# Patient Record
Sex: FEMALE | Race: WHITE | NOT HISPANIC OR LATINO | Employment: OTHER | URBAN - METROPOLITAN AREA
[De-identification: names, ages, dates, MRNs, and addresses within clinical notes are randomized per-mention and may not be internally consistent; named-entity substitution may affect disease eponyms.]

---

## 2017-02-24 ENCOUNTER — GENERIC CONVERSION - ENCOUNTER (OUTPATIENT)
Dept: OTHER | Facility: OTHER | Age: 52
End: 2017-02-24

## 2017-03-06 ENCOUNTER — ALLSCRIPTS OFFICE VISIT (OUTPATIENT)
Dept: OTHER | Facility: OTHER | Age: 52
End: 2017-03-06

## 2017-03-20 ENCOUNTER — GENERIC CONVERSION - ENCOUNTER (OUTPATIENT)
Dept: OTHER | Facility: OTHER | Age: 52
End: 2017-03-20

## 2017-05-16 ENCOUNTER — GENERIC CONVERSION - ENCOUNTER (OUTPATIENT)
Dept: OTHER | Facility: OTHER | Age: 52
End: 2017-05-16

## 2017-05-16 ENCOUNTER — ALLSCRIPTS OFFICE VISIT (OUTPATIENT)
Dept: OTHER | Facility: OTHER | Age: 52
End: 2017-05-16

## 2017-05-16 DIAGNOSIS — F33.9 RECURRENT MAJOR DEPRESSIVE DISORDER (HCC): ICD-10-CM

## 2017-05-16 LAB
ADDITIONAL INFORMATION (HISTORICAL): NORMAL
ADEQUACY: (HISTORICAL): NORMAL
COMMENT (HISTORICAL): NORMAL
CYTOTECHNOLOGIST: (HISTORICAL): NORMAL
GENERAL CATEGORIZATION (HISTORICAL): NORMAL
HPV HIGH RISK (HISTORICAL): NORMAL
HPV MRNA E6/E7 (HISTORICAL): NORMAL
INFECTION (HISTORICAL): NORMAL
INTERPRETATION (HISTORICAL): NORMAL
LMP (HISTORICAL): NORMAL
PATHOLOGIST (HISTORICAL): NORMAL
PREV. BX: (HISTORICAL): NORMAL
PREV. PAP (HISTORICAL): NORMAL
REVIEWED BY (HISTORICAL): NORMAL
SOURCE (HISTORICAL): NORMAL
SPECIMEN STATUS REPORT (HISTORICAL): NORMAL

## 2018-01-13 VITALS
SYSTOLIC BLOOD PRESSURE: 124 MMHG | DIASTOLIC BLOOD PRESSURE: 72 MMHG | WEIGHT: 134.38 LBS | HEIGHT: 64 IN | RESPIRATION RATE: 15 BRPM | OXYGEN SATURATION: 97 % | BODY MASS INDEX: 22.94 KG/M2 | HEART RATE: 70 BPM

## 2018-01-15 VITALS
RESPIRATION RATE: 15 BRPM | HEIGHT: 64 IN | WEIGHT: 139 LBS | OXYGEN SATURATION: 98 % | SYSTOLIC BLOOD PRESSURE: 118 MMHG | BODY MASS INDEX: 23.73 KG/M2 | HEART RATE: 72 BPM | DIASTOLIC BLOOD PRESSURE: 62 MMHG

## 2018-01-29 ENCOUNTER — OFFICE VISIT (OUTPATIENT)
Dept: FAMILY MEDICINE CLINIC | Facility: CLINIC | Age: 53
End: 2018-01-29
Payer: COMMERCIAL

## 2018-01-29 VITALS
RESPIRATION RATE: 15 BRPM | SYSTOLIC BLOOD PRESSURE: 118 MMHG | HEIGHT: 64 IN | OXYGEN SATURATION: 98 % | HEART RATE: 87 BPM | BODY MASS INDEX: 24.34 KG/M2 | DIASTOLIC BLOOD PRESSURE: 64 MMHG | WEIGHT: 142.6 LBS

## 2018-01-29 DIAGNOSIS — N30.01 ACUTE CYSTITIS WITH HEMATURIA: Primary | ICD-10-CM

## 2018-01-29 DIAGNOSIS — K21.9 GASTROESOPHAGEAL REFLUX DISEASE, ESOPHAGITIS PRESENCE NOT SPECIFIED: ICD-10-CM

## 2018-01-29 DIAGNOSIS — F32.A DEPRESSION, UNSPECIFIED DEPRESSION TYPE: Primary | ICD-10-CM

## 2018-01-29 PROCEDURE — 99214 OFFICE O/P EST MOD 30 MIN: CPT | Performed by: FAMILY MEDICINE

## 2018-01-29 PROCEDURE — 3008F BODY MASS INDEX DOCD: CPT | Performed by: FAMILY MEDICINE

## 2018-01-29 RX ORDER — FLUOXETINE HYDROCHLORIDE 20 MG/1
40 CAPSULE ORAL DAILY
COMMUNITY
Start: 2017-05-16 | End: 2018-12-13

## 2018-01-29 RX ORDER — OMEPRAZOLE 20 MG/1
40 CAPSULE, DELAYED RELEASE ORAL DAILY
Qty: 30 CAPSULE | Refills: 5 | Status: SHIPPED | OUTPATIENT
Start: 2018-01-29 | End: 2018-04-26 | Stop reason: SDUPTHER

## 2018-01-29 RX ORDER — CITALOPRAM 20 MG/1
20 TABLET ORAL DAILY
Qty: 30 TABLET | Refills: 5 | Status: SHIPPED | OUTPATIENT
Start: 2018-01-29 | End: 2018-12-13 | Stop reason: SDUPTHER

## 2018-01-29 RX ORDER — CIPROFLOXACIN 500 MG/1
500 TABLET, FILM COATED ORAL EVERY 12 HOURS SCHEDULED
Qty: 6 TABLET | Refills: 0 | Status: SHIPPED | OUTPATIENT
Start: 2018-01-29 | End: 2018-02-01

## 2018-01-29 RX ORDER — OMEPRAZOLE 20 MG/1
40 CAPSULE, DELAYED RELEASE ORAL DAILY
COMMUNITY
Start: 2017-01-21 | End: 2018-01-29 | Stop reason: SDUPTHER

## 2018-01-29 RX ORDER — GLATIRAMER 40 MG/ML
40 INJECTION, SOLUTION SUBCUTANEOUS 3 TIMES WEEKLY
COMMUNITY

## 2018-01-29 RX ORDER — FLUTICASONE PROPIONATE 50 MCG
2 SPRAY, SUSPENSION (ML) NASAL DAILY
COMMUNITY
Start: 2017-05-16 | End: 2019-02-01

## 2018-01-29 RX ORDER — ALPRAZOLAM 0.25 MG/1
1 TABLET ORAL
COMMUNITY
Start: 2017-03-06 | End: 2019-10-07

## 2018-01-29 RX ORDER — CITALOPRAM 20 MG/1
1 TABLET ORAL DAILY
COMMUNITY
Start: 2017-03-06 | End: 2018-01-29 | Stop reason: SDUPTHER

## 2018-01-29 NOTE — PATIENT INSTRUCTIONS

## 2018-01-29 NOTE — PROGRESS NOTES
Assessment/Plan:    No problem-specific Assessment & Plan notes found for this encounter  Diagnoses and all orders for this visit:    Acute cystitis with hematuria  -     ciprofloxacin (CIPRO) 500 mg tablet; Take 1 tablet (500 mg total) by mouth every 12 (twelve) hours for 3 days  -     Cytology, urine; Future    Other orders  -     ALPRAZolam (XANAX) 0 25 mg tablet; Take 1 tablet by mouth  -     citalopram (CeleXA) 20 mg tablet; Take 1 tablet by mouth daily  -     glatiramer acetate (COPAXONE) 20 mg/mL SOSY injection syringe; Inject under the skin  -     omeprazole (PriLOSEC) 20 mg delayed release capsule; Take 40 mg by mouth daily    -     fluticasone (FLONASE) 50 mcg/act nasal spray; 2 sprays into each nostril daily    -     FLUoxetine (PROzac) 20 mg capsule; Take 40 mg by mouth daily            Subjective:      Patient ID: Britany Bennett is a 45 y o  female  She was spontaneously at 5:00 a m  Paulo Nelson She used the restroom and noticed that the toilet water was pink  She had no pain  She has no dysuria, but she does have a bit of frequency and maybe some urgency  She has no fevers or chills  She has no vaginal discharge  She has no back pain  She has had no red beets, pedal legs, or any red dye of any kind  She has had urinary tract infections in the past but they did not feel like this  She has no flank pain  She has a 30+ pack-year history  Blood in Urine   Irritative symptoms include frequency  The following portions of the patient's history were reviewed and updated as appropriate:   She  has no past medical history on file  She  does not have a problem list on file  She  has no past surgical history on file  Her family history is not on file  She  reports that she has quit smoking  She has never used smokeless tobacco  She reports that she drinks alcohol  Her drug history is not on file    Current Outpatient Prescriptions   Medication Sig Dispense Refill    citalopram (CeleXA) 20 mg tablet Take 1 tablet by mouth daily      fluticasone (FLONASE) 50 mcg/act nasal spray 2 sprays into each nostril daily        glatiramer acetate (COPAXONE) 20 mg/mL SOSY injection syringe Inject under the skin      omeprazole (PriLOSEC) 20 mg delayed release capsule Take 40 mg by mouth daily        ALPRAZolam (XANAX) 0 25 mg tablet Take 1 tablet by mouth      ciprofloxacin (CIPRO) 500 mg tablet Take 1 tablet (500 mg total) by mouth every 12 (twelve) hours for 3 days 6 tablet 0    FLUoxetine (PROzac) 20 mg capsule Take 40 mg by mouth daily         No current facility-administered medications for this visit  No current outpatient prescriptions on file prior to visit  No current facility-administered medications on file prior to visit  She has No Known Allergies       Review of Systems   Genitourinary: Positive for frequency and hematuria  All other systems reviewed and are negative  Objective:     Physical Exam   Constitutional: She is oriented to person, place, and time  She appears well-developed and well-nourished  Neck: Normal range of motion  Neck supple  Cardiovascular: Normal rate, regular rhythm, normal heart sounds and intact distal pulses  Pulmonary/Chest: Effort normal    Abdominal: Soft  Bowel sounds are normal    Musculoskeletal: Normal range of motion  Neurological: She is alert and oriented to person, place, and time  She has normal reflexes  Skin: Skin is warm and dry  Psychiatric: She has a normal mood and affect   Her behavior is normal  Judgment and thought content normal

## 2018-04-26 DIAGNOSIS — K21.9 GASTROESOPHAGEAL REFLUX DISEASE, ESOPHAGITIS PRESENCE NOT SPECIFIED: ICD-10-CM

## 2018-04-26 RX ORDER — OMEPRAZOLE 20 MG/1
40 CAPSULE, DELAYED RELEASE ORAL DAILY
Qty: 30 CAPSULE | Refills: 5 | Status: SHIPPED | OUTPATIENT
Start: 2018-04-26 | End: 2018-06-25

## 2018-06-25 ENCOUNTER — TELEPHONE (OUTPATIENT)
Dept: FAMILY MEDICINE CLINIC | Facility: CLINIC | Age: 53
End: 2018-06-25

## 2018-06-25 DIAGNOSIS — K21.9 GASTROESOPHAGEAL REFLUX DISEASE, ESOPHAGITIS PRESENCE NOT SPECIFIED: Primary | ICD-10-CM

## 2018-06-25 RX ORDER — OMEPRAZOLE 40 MG/1
40 CAPSULE, DELAYED RELEASE ORAL DAILY
Qty: 90 CAPSULE | Refills: 3 | Status: SHIPPED | OUTPATIENT
Start: 2018-06-25 | End: 2019-07-11 | Stop reason: SDUPTHER

## 2018-12-11 RX ORDER — INFLUENZA VIRUS VACCINE 15; 15; 15; 15 UG/.5ML; UG/.5ML; UG/.5ML; UG/.5ML
SUSPENSION INTRAMUSCULAR
Refills: 0 | COMMUNITY
Start: 2018-11-02 | End: 2021-07-19 | Stop reason: ALTCHOICE

## 2018-12-13 ENCOUNTER — OFFICE VISIT (OUTPATIENT)
Dept: FAMILY MEDICINE CLINIC | Facility: CLINIC | Age: 53
End: 2018-12-13
Payer: COMMERCIAL

## 2018-12-13 VITALS
DIASTOLIC BLOOD PRESSURE: 68 MMHG | WEIGHT: 137 LBS | BODY MASS INDEX: 23.39 KG/M2 | SYSTOLIC BLOOD PRESSURE: 122 MMHG | HEIGHT: 64 IN

## 2018-12-13 DIAGNOSIS — F33.1 MODERATE EPISODE OF RECURRENT MAJOR DEPRESSIVE DISORDER (HCC): Primary | ICD-10-CM

## 2018-12-13 DIAGNOSIS — Z13.6 SCREENING FOR ISCHEMIC HEART DISEASE: ICD-10-CM

## 2018-12-13 DIAGNOSIS — Z13.1 SCREENING FOR DIABETES MELLITUS (DM): ICD-10-CM

## 2018-12-13 DIAGNOSIS — F32.A DEPRESSION, UNSPECIFIED DEPRESSION TYPE: ICD-10-CM

## 2018-12-13 DIAGNOSIS — Z11.59 ENCOUNTER FOR HEPATITIS C SCREENING TEST FOR LOW RISK PATIENT: ICD-10-CM

## 2018-12-13 PROBLEM — F32.9 MAJOR DEPRESSIVE DISORDER: Status: ACTIVE | Noted: 2018-12-13

## 2018-12-13 PROCEDURE — 99213 OFFICE O/P EST LOW 20 MIN: CPT | Performed by: FAMILY MEDICINE

## 2018-12-13 PROCEDURE — 3008F BODY MASS INDEX DOCD: CPT | Performed by: FAMILY MEDICINE

## 2018-12-13 RX ORDER — BUPROPION HYDROCHLORIDE 150 MG/1
150 TABLET ORAL DAILY
Qty: 30 TABLET | Refills: 5 | Status: SHIPPED | OUTPATIENT
Start: 2018-12-13 | End: 2019-02-01

## 2018-12-13 RX ORDER — CITALOPRAM 20 MG/1
20 TABLET ORAL DAILY
Qty: 30 TABLET | Refills: 0 | Status: SHIPPED | OUTPATIENT
Start: 2018-12-13 | End: 2019-02-01 | Stop reason: SDUPTHER

## 2018-12-13 RX ORDER — GLATIRAMER ACETATE 40 MG/ML
INJECTION, SOLUTION SUBCUTANEOUS
COMMUNITY
Start: 2018-11-26 | End: 2019-02-01

## 2018-12-13 NOTE — PATIENT INSTRUCTIONS
Bupropion (By mouth)   Bupropion ([id number])  Treats depression and aids in quitting smoking  Also prevents depression caused by seasonal affective disorder (SAD)  Brand Name(s): Aplenzin, Forfivo XL, Wellbutrin, Wellbutrin SR, Wellbutrin XL, Zyban   There may be other brand names for this medicine  When This Medicine Should Not Be Used: This medicine is not right for everyone  Do not use it if you had an allergic reaction to bupropion, or if you have seizures, anorexia, or bulimia  How to Use This Medicine:   Tablet, Long Acting Tablet  · Take your medicine as directed  Your dose may need to be changed several times to find what works best for you  · You may need to take Wellbutrin® for up to 4 weeks before you feel better  You may need to take Zyban® for 1 to 2 weeks before the date that you plan to stop smoking  · Swallow the tablet whole  Do not break, crush, divide, or chew it  · It is best to take Aplenzin® in the morning  · Do not take Wellbutrin® or Zyban® close to bedtime if you have trouble sleeping  · You may take this medicine with or without food  If you have nausea, it may help to take it with food  · If you take the extended-release tablet, part of the tablet may pass into your stools  This is normal and is nothing to worry about  · This medicine should come with a Medication Guide  Ask your pharmacist for a copy if you do not have one  · Missed dose: Skip the missed dose and go back to your regular dosing schedule  Never take extra medicine to make up for a missed dose  · Store the medicine in a closed container at room temperature, away from heat, moisture, and direct light  Drugs and Foods to Avoid:   Ask your doctor or pharmacist before using any other medicine, including over-the-counter medicines, vitamins, and herbal products  · Do not use this medicine and an MAO inhibitor (MAOI), such as linezolid or methylene blue injection, within 14 days of each other   Do not use Zyban® to quit smoking if you already take Aplenzin® or Wellbutrin® for depression, because they are the same medicine  · Some medicines can affect how bupropion works  Tell your doctor if you are using the following:   ¨ Amantadine, cimetidine, clopidogrel, cyclophosphamide, levodopa, nicotine patch, orphenadrine, tamoxifen, theophylline, thiotepa, ticlopidine  ¨ Beta blocker (such as metoprolol), insulin or diabetes medicine that you take by mouth, medicine for heart rhythm problems (propafenone, flecainide), medicine to treat HIV or AIDS (efavirenz, lopinavir, nelfinavir, ritonavir), medicine for seizures (carbamazepine, phenobarbital, phenytoin), other medicine for depression (desipramine, fluoxetine, imipramine, nortriptyline, paroxetine, sertraline), medicine to treat mental illness (haloperidol, risperidone, thioridazine), steroid medicine (hydrocortisone, methylprednisolone, prednisone, prednisolone, dexamethasone), or a blood thinner  · Limit alcohol, or do not drink alcohol at all while you are using this medicine  Warnings While Using This Medicine:   · Tell your doctor if you are pregnant or breastfeeding, or if you have kidney disease, liver disease, diabetes, glaucoma, heart disease, or high blood pressure  · Tell your doctor if you take barbiturates, benzodiazepines, antiseizure medicine, or sedatives, or if you recently stopped taking them  Tell your doctor if you have a history of drug addiction, or if you drink alcohol  · For some children, teenagers, and young adults, this medicine may increase mental or emotional problems  This may lead to thoughts of suicide and violence  Talk with your doctor right away if you have any thoughts or behavior changes that concern you  Tell your doctor if you or anyone in your family has a history of bipolar disorder or suicide attempts    · This medicine may cause the following problems:  ¨ An increased risk of seizures  ¨ Changes in mood or behavior  ¨ High blood pressure  ¨ Serious skin reactions  · This medicine may make you dizzy or drowsy  Do not drive or do anything that could be dangerous until you know how this medicine affects you  · Zyban® is only part of a complete program to help you quit smoking  You may still want to smoke at times  Have a plan to cope with these situations  · Do not stop using this medicine suddenly  Your doctor will need to slowly decrease your dose before you stop it completely  · Tell any doctor or dentist who treats you that you are using this medicine  This medicine may affect certain medical test results  · Your doctor will check your progress and the effects of this medicine at regular visits  Keep all appointments  · Keep all medicine out of the reach of children  Never share your medicine with anyone  Possible Side Effects While Using This Medicine:   Call your doctor right away if you notice any of these side effects:  · Allergic reaction: Itching or hives, swelling in your face or hands, swelling or tingling in your mouth or throat, chest tightness, trouble breathing  · Blistering, peeling, or red skin rash  · Chest pain, trouble breathing, fast, slow, or uneven heartbeat  · Muscle or joint pain, fever with rash  · Seeing or hearing things that are not there, feeling like people are against you  · Seizures or tremors  · Sudden increase in energy, racing thoughts, trouble sleeping  · Thoughts of hurting yourself, worsening depression, severe agitation or confusion  If you notice these less serious side effects, talk with your doctor:   · Dry mouth  · Eye pain, vision changes, seeing halos around lights  · Headache or dizziness  · Nausea, vomiting, constipation, diarrhea, gas, stomach pain  · Weight gain or loss  If you notice other side effects that you think are caused by this medicine, tell your doctor  Call your doctor for medical advice about side effects   You may report side effects to FDA at 6-146-FDA-4421  © 2017 [employer] [facility] Information is for End User's use only and may not be sold, redistributed or otherwise used for commercial purposes  The above information is an  only  It is not intended as medical advice for individual conditions or treatments  Talk to your doctor, nurse or pharmacist before following any medical regimen to see if it is safe and effective for you

## 2018-12-13 NOTE — PROGRESS NOTES
Assessment/Plan:    No problem-specific Assessment & Plan notes found for this encounter  Diagnoses and all orders for this visit:    Moderate episode of recurrent major depressive disorder (HCC)  -     buPROPion (WELLBUTRIN XL) 150 mg 24 hr tablet; Take 1 tablet (150 mg total) by mouth daily  -     TSH, 3rd generation; Future  -     Comprehensive metabolic panel; Future  -     Comprehensive metabolic panel; Future  -     CBC and differential; Future    Screening for diabetes mellitus (DM)  -     Comprehensive metabolic panel; Future    Screening for ischemic heart disease  -     Lipid panel; Future  -     Lipid panel; Future    Encounter for hepatitis C screening test for low risk patient  -     Hepatitis C antibody; Future          Subjective:      Patient ID: Clint Lyman is a 52 y o  female  She has depression and PTSD, stemming from health issues and family issues  She has been on a number of antidepressants  She misses one or two doses of medication every week  Celexa helped her at first   She does not think she has been on Wellbutrin  She has no HI/SI  The following portions of the patient's history were reviewed and updated as appropriate:   She  has no past medical history on file  She   Patient Active Problem List    Diagnosis Date Noted    Major depressive disorder 2018    Screening for diabetes mellitus (DM) 2018    Screening for ischemic heart disease 2018    Encounter for hepatitis C screening test for low risk patient 2018    Acute cystitis with hematuria 2018     She  has a past surgical history that includes  section; Knee arthroscopy; and Vaginal hysterectomy  Her family history includes Coronary artery disease in her father; Diabetes in her mother; Hyperlipidemia in her mother; Hypertension in her mother; Osteoporosis in her mother; Other in her mother  She  reports that she has quit smoking   She has never used smokeless tobacco  She reports that she drinks alcohol  Her drug history is not on file  Current Outpatient Prescriptions   Medication Sig Dispense Refill    ALPRAZolam (XANAX) 0 25 mg tablet Take 1 tablet by mouth      buPROPion (WELLBUTRIN XL) 150 mg 24 hr tablet Take 1 tablet (150 mg total) by mouth daily 30 tablet 5    citalopram (CeleXA) 20 mg tablet Take 1 tablet (20 mg total) by mouth daily 30 tablet 5    COPAXONE 40 MG/ML SOSY       FLUARIX QUADRIVALENT 0 5 ML MARCIAL   0    fluticasone (FLONASE) 50 mcg/act nasal spray 2 sprays into each nostril daily        glatiramer acetate (COPAXONE) 20 mg/mL SOSY injection syringe Inject under the skin      omeprazole (PriLOSEC) 40 MG capsule Take 1 capsule (40 mg total) by mouth daily 90 capsule 3     No current facility-administered medications for this visit  Current Outpatient Prescriptions on File Prior to Visit   Medication Sig    ALPRAZolam (XANAX) 0 25 mg tablet Take 1 tablet by mouth    citalopram (CeleXA) 20 mg tablet Take 1 tablet (20 mg total) by mouth daily    FLUARIX QUADRIVALENT 0 5 ML MARCIAL     fluticasone (FLONASE) 50 mcg/act nasal spray 2 sprays into each nostril daily      glatiramer acetate (COPAXONE) 20 mg/mL SOSY injection syringe Inject under the skin    omeprazole (PriLOSEC) 40 MG capsule Take 1 capsule (40 mg total) by mouth daily    [DISCONTINUED] FLUoxetine (PROzac) 20 mg capsule Take 40 mg by mouth daily       No current facility-administered medications on file prior to visit  She has No Known Allergies       Review of Systems   All other systems reviewed and are negative  Objective:      /68 (BP Location: Left arm, Patient Position: Sitting, Cuff Size: Standard)   Ht 5' 4" (1 626 m)   Wt 62 1 kg (137 lb)   BMI 23 52 kg/m²          Physical Exam   Constitutional: She is oriented to person, place, and time  She appears well-developed and well-nourished  Neck: Normal range of motion  Neck supple     Cardiovascular: Normal rate, regular rhythm, normal heart sounds and intact distal pulses  Pulmonary/Chest: Effort normal and breath sounds normal    Abdominal: Soft  Bowel sounds are normal    Musculoskeletal: Normal range of motion  Neurological: She is alert and oriented to person, place, and time  She has normal reflexes  Skin: Skin is warm and dry  Psychiatric: She has a normal mood and affect  Her behavior is normal  Judgment and thought content normal    Nursing note and vitals reviewed

## 2018-12-28 ENCOUNTER — OFFICE VISIT (OUTPATIENT)
Dept: FAMILY MEDICINE CLINIC | Facility: CLINIC | Age: 53
End: 2018-12-28
Payer: COMMERCIAL

## 2018-12-28 VITALS
HEIGHT: 64 IN | HEART RATE: 65 BPM | SYSTOLIC BLOOD PRESSURE: 110 MMHG | OXYGEN SATURATION: 98 % | BODY MASS INDEX: 23.97 KG/M2 | WEIGHT: 140.4 LBS | RESPIRATION RATE: 16 BRPM | DIASTOLIC BLOOD PRESSURE: 70 MMHG

## 2018-12-28 DIAGNOSIS — F33.1 MODERATE EPISODE OF RECURRENT MAJOR DEPRESSIVE DISORDER (HCC): Primary | ICD-10-CM

## 2018-12-28 PROCEDURE — 99213 OFFICE O/P EST LOW 20 MIN: CPT | Performed by: FAMILY MEDICINE

## 2018-12-28 RX ORDER — BUPROPION HYDROCHLORIDE 300 MG/1
300 TABLET ORAL DAILY
Qty: 30 TABLET | Refills: 5 | Status: SHIPPED | OUTPATIENT
Start: 2018-12-28 | End: 2019-03-08

## 2018-12-28 NOTE — PROGRESS NOTES
Assessment/Plan:    No problem-specific Assessment & Plan notes found for this encounter  Diagnoses and all orders for this visit:    Moderate episode of recurrent major depressive disorder (HCC)  -     buPROPion (WELLBUTRIN XL) 300 mg 24 hr tablet; Take 1 tablet (300 mg total) by mouth daily          Subjective:      Patient ID: Shalonda Horowitz is a 52 y o  female  She started Wellbutrin about 2 weeks ago  She has not noticed much difference to this point  She has has no HI/SI  She has no side effects to this point  She feels well otherwise  She is hoping that the medication gives her more energy  She is currently seeing counseling  Her TSH, CMP, and CBC are all WNL  Nausea   Associated symptoms include nausea  The following portions of the patient's history were reviewed and updated as appropriate:   She  has no past medical history on file  She   Patient Active Problem List    Diagnosis Date Noted    Major depressive disorder 2018    Screening for diabetes mellitus (DM) 2018    Screening for ischemic heart disease 2018    Encounter for hepatitis C screening test for low risk patient 2018    Acute cystitis with hematuria 2018     She  has a past surgical history that includes  section; Knee arthroscopy; and Vaginal hysterectomy  Her family history includes Coronary artery disease in her father; Diabetes in her mother; Hyperlipidemia in her mother; Hypertension in her mother; Osteoporosis in her mother; Other in her mother  She  reports that she has quit smoking  She has never used smokeless tobacco  She reports that she drinks alcohol  Her drug history is not on file    Current Outpatient Prescriptions   Medication Sig Dispense Refill    ALPRAZolam (XANAX) 0 25 mg tablet Take 1 tablet by mouth      buPROPion (WELLBUTRIN XL) 150 mg 24 hr tablet Take 1 tablet (150 mg total) by mouth daily 30 tablet 5    citalopram (CeleXA) 20 mg tablet Take 1 tablet (20 mg total) by mouth daily 30 tablet 0    COPAXONE 40 MG/ML SOSY       glatiramer acetate (COPAXONE) 20 mg/mL SOSY injection syringe Inject under the skin      omeprazole (PriLOSEC) 40 MG capsule Take 1 capsule (40 mg total) by mouth daily 90 capsule 3    buPROPion (WELLBUTRIN XL) 300 mg 24 hr tablet Take 1 tablet (300 mg total) by mouth daily 30 tablet 5    FLUARIX QUADRIVALENT 0 5 ML MARCIAL   0    fluticasone (FLONASE) 50 mcg/act nasal spray 2 sprays into each nostril daily         No current facility-administered medications for this visit  Current Outpatient Prescriptions on File Prior to Visit   Medication Sig    ALPRAZolam (XANAX) 0 25 mg tablet Take 1 tablet by mouth    buPROPion (WELLBUTRIN XL) 150 mg 24 hr tablet Take 1 tablet (150 mg total) by mouth daily    citalopram (CeleXA) 20 mg tablet Take 1 tablet (20 mg total) by mouth daily    COPAXONE 40 MG/ML SOSY     glatiramer acetate (COPAXONE) 20 mg/mL SOSY injection syringe Inject under the skin    omeprazole (PriLOSEC) 40 MG capsule Take 1 capsule (40 mg total) by mouth daily    FLUARIX QUADRIVALENT 0 5 ML MARCIAL     fluticasone (FLONASE) 50 mcg/act nasal spray 2 sprays into each nostril daily       No current facility-administered medications on file prior to visit  She has No Known Allergies       Review of Systems   Gastrointestinal: Positive for nausea  All other systems reviewed and are negative  Objective:      /70 (BP Location: Right arm, Patient Position: Sitting, Cuff Size: Standard)   Pulse 65   Resp 16   Ht 5' 4" (1 626 m)   Wt 63 7 kg (140 lb 6 4 oz)   SpO2 98%   BMI 24 10 kg/m²          Physical Exam   Constitutional: She is oriented to person, place, and time  She appears well-developed and well-nourished  Neck: Normal range of motion  Neck supple  Cardiovascular: Normal rate, regular rhythm, normal heart sounds and intact distal pulses      Pulmonary/Chest: Effort normal and breath sounds normal    Abdominal: Soft  Bowel sounds are normal    Musculoskeletal: Normal range of motion  Neurological: She is alert and oriented to person, place, and time  Skin: Skin is warm and dry  Psychiatric: She has a normal mood and affect  Her behavior is normal  Judgment and thought content normal    Nursing note and vitals reviewed

## 2019-02-01 ENCOUNTER — OFFICE VISIT (OUTPATIENT)
Dept: FAMILY MEDICINE CLINIC | Facility: CLINIC | Age: 54
End: 2019-02-01
Payer: COMMERCIAL

## 2019-02-01 VITALS
OXYGEN SATURATION: 98 % | HEIGHT: 64 IN | BODY MASS INDEX: 24.01 KG/M2 | DIASTOLIC BLOOD PRESSURE: 72 MMHG | WEIGHT: 140.6 LBS | SYSTOLIC BLOOD PRESSURE: 118 MMHG | HEART RATE: 73 BPM | RESPIRATION RATE: 15 BRPM

## 2019-02-01 DIAGNOSIS — F33.1 MODERATE EPISODE OF RECURRENT MAJOR DEPRESSIVE DISORDER (HCC): Primary | ICD-10-CM

## 2019-02-01 DIAGNOSIS — F32.A DEPRESSION, UNSPECIFIED DEPRESSION TYPE: ICD-10-CM

## 2019-02-01 DIAGNOSIS — N30.00 ACUTE CYSTITIS WITHOUT HEMATURIA: ICD-10-CM

## 2019-02-01 PROBLEM — R53.83 FATIGUE DUE TO DEPRESSION: Status: ACTIVE | Noted: 2019-02-01

## 2019-02-01 PROCEDURE — 99213 OFFICE O/P EST LOW 20 MIN: CPT | Performed by: FAMILY MEDICINE

## 2019-02-01 RX ORDER — LEVOFLOXACIN 500 MG/1
500 TABLET, FILM COATED ORAL EVERY 24 HOURS
Qty: 3 TABLET | Refills: 0 | Status: SHIPPED | OUTPATIENT
Start: 2019-02-01 | End: 2019-02-04

## 2019-02-01 RX ORDER — CITALOPRAM 20 MG/1
20 TABLET ORAL DAILY
Qty: 30 TABLET | Refills: 5 | Status: SHIPPED | OUTPATIENT
Start: 2019-02-01 | End: 2019-03-22

## 2019-02-01 RX ORDER — MODAFINIL 100 MG/1
100 TABLET ORAL DAILY
Qty: 30 TABLET | Refills: 5 | Status: SHIPPED | OUTPATIENT
Start: 2019-02-01 | End: 2019-03-22

## 2019-02-01 NOTE — ASSESSMENT & PLAN NOTE
I believe her fatigue to be multifactorial   I believe that both her depression and her MS are playing into it  We discussed a trial of Provigil today to see if we can lift some of the fatigue

## 2019-02-01 NOTE — PROGRESS NOTES
Assessment/Plan:         Diagnoses and all orders for this visit:    Moderate episode of recurrent major depressive disorder (HCC)    Depression, unspecified depression type  -     citalopram (CeleXA) 20 mg tablet; Take 1 tablet (20 mg total) by mouth daily  -     modafinil (PROVIGIL) 100 mg tablet; Take 1 tablet (100 mg total) by mouth daily          Subjective:      Patient ID: Ciro Donaldson is a 52 y o  female  She has a long history of major depression  She has been on virtually every commercially available SSRI  She thinks that she took Wellbutrin for short time in the past   Regardless, her most recent regimen included Celexa  She did very well with his medication initially, but has found that she remains depressed and fatigued  We added Wellbutrin about a month ago, starting on a dose of 150 mg  She did not feel much of a difference from 150 mg and therefore we increased to 300 mg about 2 weeks ago  Fortunately, she is feeling a bit better at this point in time  She has no side effects from her current regimen  Unfortunately, she still has significant fatigue  She is supplementing her vitamin B and vitamin D at this point in time  She had recent lab work done, but unfortunately I did not check these values prior to the initiation of vitamins  Regardless, her blood counts and red blood cell indices were within normal limits  Her renal function was perfect  Her liver function testing was within normal limits  Her electrolytes were also within normal limits  Her TSH was at goal     I dictated this note in the presence of the patien in hopes that she could sure my progress note with her therapist         The following portions of the patient's history were reviewed and updated as appropriate:   She  has no past medical history on file    She   Patient Active Problem List    Diagnosis Date Noted    Fatigue due to depression 02/01/2019    Major depressive disorder 12/13/2018    Screening for diabetes mellitus (DM) 2018    Screening for ischemic heart disease 2018    Encounter for hepatitis C screening test for low risk patient 2018    Acute cystitis with hematuria 2018     She  has a past surgical history that includes  section; Knee arthroscopy; and Vaginal hysterectomy  Her family history includes Coronary artery disease in her father; Diabetes in her mother; Hyperlipidemia in her mother; Hypertension in her mother; Osteoporosis in her mother; Other in her mother  She  reports that she has quit smoking  She has never used smokeless tobacco  She reports that she drinks alcohol  Her drug history is not on file  Current Outpatient Prescriptions   Medication Sig Dispense Refill    ALPRAZolam (XANAX) 0 25 mg tablet Take 1 tablet by mouth      buPROPion (WELLBUTRIN XL) 300 mg 24 hr tablet Take 1 tablet (300 mg total) by mouth daily 30 tablet 5    glatiramer acetate (COPAXONE) 20 mg/mL SOSY injection syringe Inject under the skin      omeprazole (PriLOSEC) 40 MG capsule Take 1 capsule (40 mg total) by mouth daily 90 capsule 3    buPROPion (WELLBUTRIN XL) 150 mg 24 hr tablet Take 1 tablet (150 mg total) by mouth daily 30 tablet 5    citalopram (CeleXA) 20 mg tablet Take 1 tablet (20 mg total) by mouth daily 30 tablet 5    COPAXONE 40 MG/ML SOSY       FLUARIX QUADRIVALENT 0 5 ML MARCIAL   0    fluticasone (FLONASE) 50 mcg/act nasal spray 2 sprays into each nostril daily        modafinil (PROVIGIL) 100 mg tablet Take 1 tablet (100 mg total) by mouth daily 30 tablet 5     No current facility-administered medications for this visit        Current Outpatient Prescriptions on File Prior to Visit   Medication Sig    ALPRAZolam (XANAX) 0 25 mg tablet Take 1 tablet by mouth    buPROPion (WELLBUTRIN XL) 300 mg 24 hr tablet Take 1 tablet (300 mg total) by mouth daily    glatiramer acetate (COPAXONE) 20 mg/mL SOSY injection syringe Inject under the skin    omeprazole (PriLOSEC) 40 MG capsule Take 1 capsule (40 mg total) by mouth daily    [DISCONTINUED] citalopram (CeleXA) 20 mg tablet Take 1 tablet (20 mg total) by mouth daily    buPROPion (WELLBUTRIN XL) 150 mg 24 hr tablet Take 1 tablet (150 mg total) by mouth daily    COPAXONE 40 MG/ML SOSY     FLUARIX QUADRIVALENT 0 5 ML MARCIAL     fluticasone (FLONASE) 50 mcg/act nasal spray 2 sprays into each nostril daily       No current facility-administered medications on file prior to visit  She has No Known Allergies       Review of Systems   All other systems reviewed and are negative  Objective:      /72 (BP Location: Left arm, Patient Position: Sitting, Cuff Size: Standard)   Pulse 73   Resp 15   Ht 5' 4" (1 626 m)   Wt 63 8 kg (140 lb 9 6 oz)   SpO2 98%   BMI 24 13 kg/m²          Physical Exam   Constitutional: She is oriented to person, place, and time  She appears well-developed and well-nourished  Neck: Normal range of motion  Neck supple  Cardiovascular: Normal rate, regular rhythm, normal heart sounds and intact distal pulses  Pulmonary/Chest: Effort normal and breath sounds normal    Abdominal: Soft  Bowel sounds are normal    Musculoskeletal: Normal range of motion  Neurological: She is alert and oriented to person, place, and time  Skin: Skin is warm and dry  Psychiatric: She has a normal mood and affect  Her behavior is normal  Judgment and thought content normal    Nursing note and vitals reviewed

## 2019-03-08 ENCOUNTER — OFFICE VISIT (OUTPATIENT)
Dept: FAMILY MEDICINE CLINIC | Facility: CLINIC | Age: 54
End: 2019-03-08
Payer: COMMERCIAL

## 2019-03-08 VITALS
WEIGHT: 140.4 LBS | HEIGHT: 64 IN | HEART RATE: 68 BPM | DIASTOLIC BLOOD PRESSURE: 64 MMHG | RESPIRATION RATE: 16 BRPM | BODY MASS INDEX: 23.97 KG/M2 | OXYGEN SATURATION: 98 % | SYSTOLIC BLOOD PRESSURE: 106 MMHG

## 2019-03-08 DIAGNOSIS — F32.A FATIGUE DUE TO DEPRESSION: ICD-10-CM

## 2019-03-08 DIAGNOSIS — G47.33 OSA (OBSTRUCTIVE SLEEP APNEA): Primary | ICD-10-CM

## 2019-03-08 DIAGNOSIS — R53.83 FATIGUE DUE TO DEPRESSION: ICD-10-CM

## 2019-03-08 PROCEDURE — 3008F BODY MASS INDEX DOCD: CPT | Performed by: FAMILY MEDICINE

## 2019-03-08 PROCEDURE — 99213 OFFICE O/P EST LOW 20 MIN: CPT | Performed by: FAMILY MEDICINE

## 2019-03-08 RX ORDER — BUPROPION HYDROCHLORIDE 150 MG/1
150 TABLET ORAL DAILY
Qty: 30 TABLET | Refills: 1 | Status: SHIPPED | OUTPATIENT
Start: 2019-03-08 | End: 2019-10-07

## 2019-03-08 NOTE — PROGRESS NOTES
Assessment/Plan:    No problem-specific Assessment & Plan notes found for this encounter  Diagnoses and all orders for this visit:    AUGIE (obstructive sleep apnea)  -     Home Study; Future          Subjective:      Patient ID: Shalonda Horowitz is a 52 y o  female  She has excessive fatigue  She has daytime sleepiness  She snores  She suffers from bruxism  She has comorbid MS  She is not being treated for diabetes or hypertension  Her neck circumference is 15 5 inches  She is 52years old  STOP-BANG is 4  Her depression is under better control  She has a component of SAD  She would like to taper her medication over the summer  She has no HI/SI  The following portions of the patient's history were reviewed and updated as appropriate:   She  has no past medical history on file  She   Patient Active Problem List    Diagnosis Date Noted    Fatigue due to depression 2019    Major depressive disorder 2018    Screening for diabetes mellitus (DM) 2018    Screening for ischemic heart disease 2018    Encounter for hepatitis C screening test for low risk patient 2018    Acute cystitis with hematuria 2018     She  has a past surgical history that includes  section; Knee arthroscopy; and Vaginal hysterectomy  Her family history includes Coronary artery disease in her father; Diabetes in her mother; Hyperlipidemia in her mother; Hypertension in her mother; Osteoporosis in her mother; Other in her mother  She  reports that she has quit smoking  She has never used smokeless tobacco  She reports that she drinks alcohol  Her drug history is not on file    Current Outpatient Medications   Medication Sig Dispense Refill    ALPRAZolam (XANAX) 0 25 mg tablet Take 1 tablet by mouth      buPROPion (WELLBUTRIN XL) 300 mg 24 hr tablet Take 1 tablet (300 mg total) by mouth daily 30 tablet 5    citalopram (CeleXA) 20 mg tablet Take 1 tablet (20 mg total) by mouth daily 30 tablet 5    FLUARIX QUADRIVALENT 0 5 ML MARCIAL   0    glatiramer acetate (COPAXONE) 20 mg/mL SOSY injection syringe Inject under the skin      omeprazole (PriLOSEC) 40 MG capsule Take 1 capsule (40 mg total) by mouth daily 90 capsule 3    modafinil (PROVIGIL) 100 mg tablet Take 1 tablet (100 mg total) by mouth daily (Patient not taking: Reported on 3/8/2019) 30 tablet 5     No current facility-administered medications for this visit  Current Outpatient Medications on File Prior to Visit   Medication Sig    ALPRAZolam (XANAX) 0 25 mg tablet Take 1 tablet by mouth    buPROPion (WELLBUTRIN XL) 300 mg 24 hr tablet Take 1 tablet (300 mg total) by mouth daily    citalopram (CeleXA) 20 mg tablet Take 1 tablet (20 mg total) by mouth daily    FLUARIX QUADRIVALENT 0 5 ML MARCIAL     glatiramer acetate (COPAXONE) 20 mg/mL SOSY injection syringe Inject under the skin    omeprazole (PriLOSEC) 40 MG capsule Take 1 capsule (40 mg total) by mouth daily    modafinil (PROVIGIL) 100 mg tablet Take 1 tablet (100 mg total) by mouth daily (Patient not taking: Reported on 3/8/2019)     No current facility-administered medications on file prior to visit  She has No Known Allergies       Review of Systems   All other systems reviewed and are negative  Objective:      /64 (BP Location: Right arm, Patient Position: Sitting, Cuff Size: Standard)   Pulse 68   Resp 16   Ht 5' 4" (1 626 m)   Wt 63 7 kg (140 lb 6 4 oz)   SpO2 98%   BMI 24 10 kg/m²          Physical Exam   Constitutional: She is oriented to person, place, and time  She appears well-developed and well-nourished  Neck: Normal range of motion  Neck supple  Cardiovascular: Normal rate, regular rhythm, normal heart sounds and intact distal pulses  Pulmonary/Chest: Effort normal and breath sounds normal    Abdominal: Soft  Bowel sounds are normal    Musculoskeletal: Normal range of motion     Neurological: She is oriented to person, place, and time  Skin: Skin is dry  Capillary refill takes less than 2 seconds  Psychiatric: She has a normal mood and affect  Her behavior is normal  Judgment and thought content normal    Nursing note and vitals reviewed

## 2019-03-08 NOTE — PATIENT INSTRUCTIONS
When you feel you are ready to stop the Wellbutrin for the nicer weather, please take 150 mg daily for about 2 weeks and then take it every other day for about 2 weeks  Let us restart the Wellbutrin right after Labor Day

## 2019-03-22 ENCOUNTER — TELEPHONE (OUTPATIENT)
Dept: FAMILY MEDICINE CLINIC | Facility: CLINIC | Age: 54
End: 2019-03-22

## 2019-03-22 DIAGNOSIS — F32.A DEPRESSION, UNSPECIFIED DEPRESSION TYPE: Primary | ICD-10-CM

## 2019-03-22 RX ORDER — CITALOPRAM 40 MG/1
40 TABLET ORAL DAILY
Qty: 30 TABLET | Refills: 5 | Status: SHIPPED | OUTPATIENT
Start: 2019-03-22 | End: 2020-01-09

## 2019-04-03 ENCOUNTER — TELEPHONE (OUTPATIENT)
Dept: FAMILY MEDICINE CLINIC | Facility: CLINIC | Age: 54
End: 2019-04-03

## 2019-04-03 DIAGNOSIS — L03.90 CELLULITIS, UNSPECIFIED CELLULITIS SITE: Primary | ICD-10-CM

## 2019-04-03 RX ORDER — CEPHALEXIN 500 MG/1
500 CAPSULE ORAL EVERY 6 HOURS SCHEDULED
Qty: 28 CAPSULE | Refills: 0 | Status: SHIPPED | OUTPATIENT
Start: 2019-04-03 | End: 2019-04-10

## 2019-07-08 ENCOUNTER — OFFICE VISIT (OUTPATIENT)
Dept: FAMILY MEDICINE CLINIC | Facility: CLINIC | Age: 54
End: 2019-07-08
Payer: COMMERCIAL

## 2019-07-08 VITALS
OXYGEN SATURATION: 98 % | SYSTOLIC BLOOD PRESSURE: 110 MMHG | HEIGHT: 64 IN | WEIGHT: 143 LBS | DIASTOLIC BLOOD PRESSURE: 68 MMHG | RESPIRATION RATE: 16 BRPM | BODY MASS INDEX: 24.41 KG/M2 | HEART RATE: 68 BPM

## 2019-07-08 DIAGNOSIS — M19.90 ARTHRITIS: Primary | ICD-10-CM

## 2019-07-08 PROCEDURE — 1036F TOBACCO NON-USER: CPT | Performed by: FAMILY MEDICINE

## 2019-07-08 PROCEDURE — 99213 OFFICE O/P EST LOW 20 MIN: CPT | Performed by: FAMILY MEDICINE

## 2019-07-08 PROCEDURE — 3008F BODY MASS INDEX DOCD: CPT | Performed by: FAMILY MEDICINE

## 2019-07-08 RX ORDER — GABAPENTIN 300 MG/1
300 CAPSULE ORAL 3 TIMES DAILY
Qty: 90 CAPSULE | Refills: 5 | Status: SHIPPED | OUTPATIENT
Start: 2019-07-08 | End: 2019-10-07

## 2019-07-08 NOTE — PROGRESS NOTES
Assessment/Plan:    No problem-specific Assessment & Plan notes found for this encounter  Diagnoses and all orders for this visit:    Arthritis  -     RF Screen w/ Reflex to Titer; Future  -     Cyclic citrul peptide antibody, IgG; Future  -     MARE Screen w/ Reflex to Titer/Pattern; Future  -     Sedimentation rate, automated; Future  -     gabapentin (NEURONTIN) 300 mg capsule; Take 1 capsule (300 mg total) by mouth 3 (three) times a day          Subjective:      Patient ID: Susannah Soto is a 52 y o  female  She is interested in medical marijuana  She would be an excellent candidate for medical marijuana  She has difficult to control depression, anxiety, fibromyalgia, and MS  She is feeling better from a depression and fatigue standpoint "now that the sun's out "          The following portions of the patient's history were reviewed and updated as appropriate:   She  has no past medical history on file  She   Patient Active Problem List    Diagnosis Date Noted    Fatigue due to depression 2019    Major depressive disorder 2018    Screening for diabetes mellitus (DM) 2018    Screening for ischemic heart disease 2018    Encounter for hepatitis C screening test for low risk patient 2018    Acute cystitis with hematuria 2018     She  has a past surgical history that includes  section; Knee arthroscopy; and Vaginal hysterectomy  Her family history includes Coronary artery disease in her father; Diabetes in her mother; Hyperlipidemia in her mother; Hypertension in her mother; Osteoporosis in her mother; Other in her mother  She  reports that she has quit smoking  She has never used smokeless tobacco  She reports that she drinks alcohol  Her drug history is not on file    Current Outpatient Medications   Medication Sig Dispense Refill    ALPRAZolam (XANAX) 0 25 mg tablet Take 1 tablet by mouth      buPROPion (WELLBUTRIN XL) 150 mg 24 hr tablet Take 1 tablet (150 mg total) by mouth daily 30 tablet 1    citalopram (CeleXA) 40 mg tablet Take 1 tablet (40 mg total) by mouth daily 30 tablet 5    FLUARIX QUADRIVALENT 0 5 ML MARCIAL   0    gabapentin (NEURONTIN) 300 mg capsule Take 1 capsule (300 mg total) by mouth 3 (three) times a day 90 capsule 5    glatiramer acetate (COPAXONE) 20 mg/mL SOSY injection syringe Inject under the skin      omeprazole (PriLOSEC) 40 MG capsule Take 1 capsule (40 mg total) by mouth daily 90 capsule 3     No current facility-administered medications for this visit  Current Outpatient Medications on File Prior to Visit   Medication Sig    ALPRAZolam (XANAX) 0 25 mg tablet Take 1 tablet by mouth    buPROPion (WELLBUTRIN XL) 150 mg 24 hr tablet Take 1 tablet (150 mg total) by mouth daily    citalopram (CeleXA) 40 mg tablet Take 1 tablet (40 mg total) by mouth daily    FLUARIX QUADRIVALENT 0 5 ML MARCIAL     glatiramer acetate (COPAXONE) 20 mg/mL SOSY injection syringe Inject under the skin    omeprazole (PriLOSEC) 40 MG capsule Take 1 capsule (40 mg total) by mouth daily     No current facility-administered medications on file prior to visit  She has No Known Allergies       Review of Systems   All other systems reviewed and are negative  Objective:      /68   Pulse 68   Resp 16   Ht 5' 4" (1 626 m)   Wt 64 9 kg (143 lb)   SpO2 98%   BMI 24 55 kg/m²          Physical Exam   Constitutional: She appears well-developed and well-nourished  Neck: Normal range of motion  Neck supple  Cardiovascular: Normal rate, regular rhythm, normal heart sounds and intact distal pulses  Pulmonary/Chest: Effort normal and breath sounds normal    Abdominal: Soft  Bowel sounds are normal    Musculoskeletal: Normal range of motion  Skin: Skin is warm  Capillary refill takes less than 2 seconds  Psychiatric: She has a normal mood and affect   Her behavior is normal  Judgment and thought content normal    Nursing note and vitals reviewed

## 2019-07-11 DIAGNOSIS — K21.9 GASTROESOPHAGEAL REFLUX DISEASE, ESOPHAGITIS PRESENCE NOT SPECIFIED: ICD-10-CM

## 2019-07-11 RX ORDER — OMEPRAZOLE 40 MG/1
CAPSULE, DELAYED RELEASE ORAL
Qty: 90 CAPSULE | Refills: 3 | Status: SHIPPED | OUTPATIENT
Start: 2019-07-11 | End: 2020-04-02

## 2019-09-10 ENCOUNTER — TELEPHONE (OUTPATIENT)
Dept: FAMILY MEDICINE CLINIC | Facility: CLINIC | Age: 54
End: 2019-09-10

## 2019-09-10 DIAGNOSIS — M79.672 LEFT FOOT PAIN: Primary | ICD-10-CM

## 2019-10-07 ENCOUNTER — OFFICE VISIT (OUTPATIENT)
Dept: FAMILY MEDICINE CLINIC | Facility: CLINIC | Age: 54
End: 2019-10-07
Payer: COMMERCIAL

## 2019-10-07 VITALS
OXYGEN SATURATION: 96 % | DIASTOLIC BLOOD PRESSURE: 78 MMHG | HEART RATE: 84 BPM | SYSTOLIC BLOOD PRESSURE: 132 MMHG | RESPIRATION RATE: 16 BRPM | HEIGHT: 64 IN | BODY MASS INDEX: 25.1 KG/M2 | WEIGHT: 147 LBS

## 2019-10-07 DIAGNOSIS — F33.1 MODERATE EPISODE OF RECURRENT MAJOR DEPRESSIVE DISORDER (HCC): Primary | ICD-10-CM

## 2019-10-07 PROCEDURE — 3008F BODY MASS INDEX DOCD: CPT | Performed by: FAMILY MEDICINE

## 2019-10-07 PROCEDURE — 99213 OFFICE O/P EST LOW 20 MIN: CPT | Performed by: FAMILY MEDICINE

## 2019-10-07 RX ORDER — IBUPROFEN 200 MG
200 TABLET ORAL EVERY 6 HOURS PRN
COMMUNITY

## 2019-10-07 RX ORDER — CLINDAMYCIN HYDROCHLORIDE 150 MG/1
CAPSULE ORAL
COMMUNITY
Start: 2019-09-04 | End: 2019-10-07

## 2019-10-07 NOTE — PROGRESS NOTES
Assessment/Plan:    Major depressive disorder  Stable  Continue current  Recheck in January  There are no diagnoses linked to this encounter  Subjective:      Patient ID: Roxane Marcial is a 62 y o  female  She is happy with the results of the Celexa  She has a seasonal component to her depression  She denies side effects  She has no HI/SI  She has no recent flares of MS  She has joint pain and swelling  She is trying CBD oil but not medical marijuana  The following portions of the patient's history were reviewed and updated as appropriate:   She  has no past medical history on file  She   Patient Active Problem List    Diagnosis Date Noted    Fatigue due to depression 2019    Major depressive disorder 2018    Screening for diabetes mellitus (DM) 2018    Screening for ischemic heart disease 2018    Encounter for hepatitis C screening test for low risk patient 2018    Acute cystitis with hematuria 2018     She  has a past surgical history that includes  section; Knee arthroscopy; and Vaginal hysterectomy  Her family history includes Coronary artery disease in her father; Diabetes in her mother; Hyperlipidemia in her mother; Hypertension in her mother; Osteoporosis in her mother; Other in her mother  She  reports that she has quit smoking  She has never used smokeless tobacco  She reports that she drinks alcohol  Her drug history is not on file    Current Outpatient Medications   Medication Sig Dispense Refill    citalopram (CeleXA) 40 mg tablet Take 1 tablet (40 mg total) by mouth daily 30 tablet 5    FLUARIX QUADRIVALENT 0 5 ML MARCIAL   0    glatiramer acetate (COPAXONE) 20 mg/mL SOSY injection syringe Inject under the skin      ibuprofen (MOTRIN) 200 mg tablet Take 200 mg by mouth every 6 (six) hours as needed      omeprazole (PriLOSEC) 40 MG capsule TAKE 1 CAPSULE BY MOUTH DAILY 90 capsule 3     No current facility-administered medications for this visit  Current Outpatient Medications on File Prior to Visit   Medication Sig    citalopram (CeleXA) 40 mg tablet Take 1 tablet (40 mg total) by mouth daily    FLUARIX QUADRIVALENT 0 5 ML MARCIAL     glatiramer acetate (COPAXONE) 20 mg/mL SOSY injection syringe Inject under the skin    ibuprofen (MOTRIN) 200 mg tablet Take 200 mg by mouth every 6 (six) hours as needed    omeprazole (PriLOSEC) 40 MG capsule TAKE 1 CAPSULE BY MOUTH DAILY    [DISCONTINUED] ALPRAZolam (XANAX) 0 25 mg tablet Take 1 tablet by mouth    [DISCONTINUED] buPROPion (WELLBUTRIN XL) 150 mg 24 hr tablet Take 1 tablet (150 mg total) by mouth daily    [DISCONTINUED] clindamycin (CLEOCIN) 150 mg capsule     [DISCONTINUED] gabapentin (NEURONTIN) 300 mg capsule Take 1 capsule (300 mg total) by mouth 3 (three) times a day     No current facility-administered medications on file prior to visit  She has No Known Allergies       Review of Systems   All other systems reviewed and are negative  Objective:      /78   Pulse 84   Resp 16   Ht 5' 4" (1 626 m)   Wt 66 7 kg (147 lb)   SpO2 96%   BMI 25 23 kg/m²          Physical Exam   Constitutional: She is oriented to person, place, and time  She appears well-developed and well-nourished  Cardiovascular: Normal rate, regular rhythm, normal heart sounds and intact distal pulses  Pulmonary/Chest: Effort normal and breath sounds normal    Abdominal: Soft  Bowel sounds are normal    Musculoskeletal: Normal range of motion  Tender lateral epicondyle, left   Neurological: She is alert and oriented to person, place, and time  Skin: Skin is warm and dry  Psychiatric: She has a normal mood and affect  Her behavior is normal  Judgment and thought content normal    Nursing note and vitals reviewed  BMI Counseling: Body mass index is 25 23 kg/m²   The BMI is above normal  Nutrition recommendations include reducing portion sizes, decreasing overall calorie intake, 3-5 servings of fruits/vegetables daily, reducing fast food intake, consuming healthier snacks, decreasing soda and/or juice intake, moderation in carbohydrate intake, increasing intake of lean protein, reducing intake of saturated fat and trans fat and reducing intake of cholesterol  Exercise recommendations include moderate aerobic physical activity for 150 minutes/week, vigorous aerobic physical activity for 75 minutes/week, exercising 3-5 times per week, joining a gym and strength training exercises

## 2020-01-09 ENCOUNTER — OFFICE VISIT (OUTPATIENT)
Dept: FAMILY MEDICINE CLINIC | Facility: CLINIC | Age: 55
End: 2020-01-09
Payer: COMMERCIAL

## 2020-01-09 VITALS
DIASTOLIC BLOOD PRESSURE: 76 MMHG | HEIGHT: 64 IN | OXYGEN SATURATION: 99 % | SYSTOLIC BLOOD PRESSURE: 122 MMHG | WEIGHT: 148 LBS | BODY MASS INDEX: 25.27 KG/M2 | HEART RATE: 69 BPM

## 2020-01-09 DIAGNOSIS — R73.09 ABNORMAL GLUCOSE TOLERANCE TEST (GTT): ICD-10-CM

## 2020-01-09 DIAGNOSIS — B00.1 COLD SORE: ICD-10-CM

## 2020-01-09 DIAGNOSIS — F32.A FATIGUE DUE TO DEPRESSION: Primary | ICD-10-CM

## 2020-01-09 DIAGNOSIS — R53.83 FATIGUE DUE TO DEPRESSION: Primary | ICD-10-CM

## 2020-01-09 PROCEDURE — 1036F TOBACCO NON-USER: CPT | Performed by: FAMILY MEDICINE

## 2020-01-09 PROCEDURE — 99213 OFFICE O/P EST LOW 20 MIN: CPT | Performed by: FAMILY MEDICINE

## 2020-01-09 PROCEDURE — 3008F BODY MASS INDEX DOCD: CPT | Performed by: FAMILY MEDICINE

## 2020-01-09 RX ORDER — VALACYCLOVIR HYDROCHLORIDE 1 G/1
TABLET, FILM COATED ORAL
Qty: 2 TABLET | Refills: 5 | Status: SHIPPED | OUTPATIENT
Start: 2020-01-09 | End: 2022-02-02 | Stop reason: ALTCHOICE

## 2020-01-09 NOTE — PROGRESS NOTES
BMI Counseling: Body mass index is 25 4 kg/m²  The BMI is above normal  Nutrition recommendations include reducing portion sizes, decreasing overall calorie intake, 3-5 servings of fruits/vegetables daily, reducing fast food intake, consuming healthier snacks, decreasing soda and/or juice intake, moderation in carbohydrate intake, increasing intake of lean protein, reducing intake of saturated fat and trans fat and reducing intake of cholesterol  Exercise recommendations include moderate aerobic physical activity for 150 minutes/week, vigorous aerobic physical activity for 75 minutes/week, exercising 3-5 times per week, joining a gym and strength training exercises

## 2020-01-09 NOTE — PROGRESS NOTES
Assessment/Plan:    No problem-specific Assessment & Plan notes found for this encounter  Diagnoses and all orders for this visit:    Fatigue due to depression  -     Comprehensive metabolic panel; Future    Cold sore  -     valACYclovir (VALTREX) 1,000 mg tablet; Take 2 tablets with onset of cold sore symptoms  Abnormal glucose tolerance test (GTT)  -     HEMOGLOBIN A1C W/ EAG ESTIMATION; Future          Subjective:      Patient ID: Naida Carballo is a 62 y o  female  She has discontinued her antidepressant medication  She is doing light therapy with a SAD lamp, taking a multivitamin, seeing a therapist, and using weight blanket  She is sleeping well  She feels better than she did last year at this time  She is happy with her current regimen  Her thyroid function is within normal limits from lab work in 2018  Her lipid panel is at goal from lab work in 2018  She screens negative for type 2 diabetes mellitus  She had a mammogram done in the [facility] this year  Her gyn exam is up-to-date  She ha ss cold sore in her nose  It is painful  It was swollen initially, but has since gone down  The following portions of the patient's history were reviewed and updated as appropriate:   She  has no past medical history on file  She   Patient Active Problem List    Diagnosis Date Noted    BMI 25 0-25 9,adult 2020    Fatigue due to depression 2019    Major depressive disorder 2018    Screening for diabetes mellitus (DM) 2018    Screening for ischemic heart disease 2018    Encounter for hepatitis C screening test for low risk patient 2018    Acute cystitis with hematuria 2018     She  has a past surgical history that includes  section; Knee arthroscopy; and Vaginal hysterectomy    Her family history includes Coronary artery disease in her father; Diabetes in her mother; Hyperlipidemia in her mother; Hypertension in her mother; Osteoporosis in her mother; Other in her mother  She  reports that she has quit smoking  She has never used smokeless tobacco  She reports that she drinks alcohol  Her drug history is not on file  Current Outpatient Medications   Medication Sig Dispense Refill    FLUARIX QUADRIVALENT 0 5 ML MARCIAL   0    glatiramer acetate (COPAXONE) 20 mg/mL SOSY injection syringe Inject under the skin      ibuprofen (MOTRIN) 200 mg tablet Take 200 mg by mouth every 6 (six) hours as needed      omeprazole (PriLOSEC) 40 MG capsule TAKE 1 CAPSULE BY MOUTH DAILY 90 capsule 3    valACYclovir (VALTREX) 1,000 mg tablet Take 2 tablets with onset of cold sore symptoms  2 tablet 5     No current facility-administered medications for this visit  Current Outpatient Medications on File Prior to Visit   Medication Sig    FLUARIX QUADRIVALENT 0 5 ML MARCIAL     glatiramer acetate (COPAXONE) 20 mg/mL SOSY injection syringe Inject under the skin    ibuprofen (MOTRIN) 200 mg tablet Take 200 mg by mouth every 6 (six) hours as needed    omeprazole (PriLOSEC) 40 MG capsule TAKE 1 CAPSULE BY MOUTH DAILY    [DISCONTINUED] citalopram (CeleXA) 40 mg tablet Take 1 tablet (40 mg total) by mouth daily     No current facility-administered medications on file prior to visit  She has No Known Allergies       Review of Systems   All other systems reviewed and are negative  Objective:      /76   Pulse 69   Ht 5' 4" (1 626 m)   Wt 67 1 kg (148 lb)   SpO2 99%   BMI 25 40 kg/m²          Physical Exam   Constitutional: She is oriented to person, place, and time  She appears well-developed and well-nourished  Neck: Normal range of motion  Neck supple  Cardiovascular: Normal rate, regular rhythm, normal heart sounds and intact distal pulses  Pulmonary/Chest: Effort normal and breath sounds normal    Abdominal: Soft  Bowel sounds are normal    Musculoskeletal: Normal range of motion  Neurological: She is alert and oriented to person, place, and time  Skin: Skin is warm and dry  Capillary refill takes less than 2 seconds  Psychiatric: She has a normal mood and affect  Her behavior is normal  Judgment and thought content normal    Nursing note and vitals reviewed

## 2020-01-13 LAB — HBA1C MFR BLD HPLC: 5.3 %

## 2020-04-02 DIAGNOSIS — K21.9 GASTROESOPHAGEAL REFLUX DISEASE, ESOPHAGITIS PRESENCE NOT SPECIFIED: ICD-10-CM

## 2020-04-02 RX ORDER — OMEPRAZOLE 40 MG/1
CAPSULE, DELAYED RELEASE ORAL
Qty: 90 CAPSULE | Refills: 3 | Status: SHIPPED | OUTPATIENT
Start: 2020-04-02 | End: 2020-07-13 | Stop reason: SDUPTHER

## 2020-07-13 ENCOUNTER — OFFICE VISIT (OUTPATIENT)
Dept: FAMILY MEDICINE CLINIC | Facility: CLINIC | Age: 55
End: 2020-07-13
Payer: COMMERCIAL

## 2020-07-13 DIAGNOSIS — G62.9 NEUROPATHY: ICD-10-CM

## 2020-07-13 DIAGNOSIS — Z09 FOLLOW-UP EXAM, 3-6 MONTHS SINCE PREVIOUS EXAM: Primary | ICD-10-CM

## 2020-07-13 DIAGNOSIS — K21.9 GASTROESOPHAGEAL REFLUX DISEASE, ESOPHAGITIS PRESENCE NOT SPECIFIED: ICD-10-CM

## 2020-07-13 PROCEDURE — 3008F BODY MASS INDEX DOCD: CPT | Performed by: FAMILY MEDICINE

## 2020-07-13 PROCEDURE — 99213 OFFICE O/P EST LOW 20 MIN: CPT | Performed by: FAMILY MEDICINE

## 2020-07-13 PROCEDURE — 1036F TOBACCO NON-USER: CPT | Performed by: FAMILY MEDICINE

## 2020-07-13 RX ORDER — OMEPRAZOLE 40 MG/1
40 CAPSULE, DELAYED RELEASE ORAL DAILY
Qty: 90 CAPSULE | Refills: 3 | Status: SHIPPED | OUTPATIENT
Start: 2020-07-13 | End: 2021-03-22 | Stop reason: SDUPTHER

## 2020-07-19 VITALS
HEIGHT: 64 IN | TEMPERATURE: 98.5 F | HEART RATE: 71 BPM | WEIGHT: 139 LBS | SYSTOLIC BLOOD PRESSURE: 130 MMHG | BODY MASS INDEX: 23.73 KG/M2 | DIASTOLIC BLOOD PRESSURE: 80 MMHG | OXYGEN SATURATION: 98 %

## 2020-07-19 NOTE — PROGRESS NOTES
Assessment/Plan:    Clint Lyman is a 62years old female with past medical history of depression, multiple sclerosis presents today for follow-up visit  No acute complaints today  Patient is recommended to completely MRI brain and cervical spine for follow up of multiple sclerosis per her neurologist  Encouraged patient to complete routine blood works  F/u in 3 months  Case d/w Dr Cody Ivory  Diagnoses and all orders for this visit:    Follow-up exam, 3-6 months since previous exam    Gastroesophageal reflux disease, esophagitis presence not specified  -     omeprazole (PriLOSEC) 40 MG capsule; Take 1 capsule (40 mg total) by mouth daily    Neuropathy  -     CBC and differential; Future  -     TSH, 3rd generation; Future  -     Vitamin B12; Future        Subjective:      Patient ID: Clint Lyman is a 62 y o  female  HPI    Patient is a 62years old female with past medical history of depression, multiple sclerosis presents today for follow-up visit  She is currently on Copaxone 40 mg three times a week for MS  She follows with neurology who recommended follow up MRI brain and cervical spine  For her depression she is not currently on any antidepressants  Reports stable mood  Blood work ordered at last office visit but patient has not completed  No other complaints today  The following portions of the patient's history were reviewed and updated as appropriate: allergies, current medications, past family history, past medical history, past social history, past surgical history and problem list     Review of Systems   Constitutional: Negative for chills and fever  HENT: Negative for congestion, rhinorrhea and sore throat  Eyes: Negative for visual disturbance  Respiratory: Negative for cough and shortness of breath  Cardiovascular: Negative for chest pain and palpitations  Gastrointestinal: Negative for abdominal pain, nausea and vomiting  Genitourinary: Negative for dysuria  Musculoskeletal: Negative for back pain  Skin: Negative for color change  Neurological: Negative for dizziness and headaches  Hematological: Does not bruise/bleed easily  Psychiatric/Behavioral: Negative for confusion  Objective:      Pulse 71   Temp 98 5 °F (36 9 °C)   Ht 5' 4" (1 626 m)   Wt 63 kg (139 lb)   SpO2 98%   BMI 23 86 kg/m²          Physical Exam   Constitutional: She is oriented to person, place, and time  She appears well-developed  No distress  HENT:   Head: Normocephalic  Mouth/Throat: No oropharyngeal exudate  Eyes: Conjunctivae are normal  Right eye exhibits no discharge  Left eye exhibits no discharge  No scleral icterus  Right eye gaze palsy  Neck: Normal range of motion  Cardiovascular: Normal rate, regular rhythm and normal heart sounds  No murmur heard  Pulmonary/Chest: Effort normal and breath sounds normal  No respiratory distress  She has no wheezes  Abdominal: Soft  Bowel sounds are normal  She exhibits no distension  There is no tenderness  Musculoskeletal: She exhibits no edema or tenderness  Lymphadenopathy:     She has no cervical adenopathy  Neurological: She is alert and oriented to person, place, and time  Skin: No erythema  Psychiatric: She has a normal mood and affect  Her behavior is normal    Nursing note and vitals reviewed

## 2021-02-18 ENCOUNTER — TELEPHONE (OUTPATIENT)
Dept: ADMINISTRATIVE | Facility: OTHER | Age: 56
End: 2021-02-18

## 2021-02-18 NOTE — TELEPHONE ENCOUNTER
----- Message from Adelaide Nunez sent at 2/18/2021 11:16 AM EST -----  Regarding: mammogram  02/18/21 11:16 AM    Hello, our patient Howard Vaughan has had Mammogram completed/performed  Please assist in updating the patient chart by pulling the Care Everywhere (CE) document  The date of service is 1/11/21       Thank you,  Sylvia Cornejo MA  [facility]

## 2021-02-18 NOTE — TELEPHONE ENCOUNTER
Upon review of the In Basket request we were able to locate, review, and update the patient chart as requested for Mammogram     Any additional questions or concerns should be emailed to the Practice Liaisons via [email or web address]  [email or web address] email, please do not reply via In Basket      Thank you  Rubina De Souza MA

## 2021-03-22 ENCOUNTER — OFFICE VISIT (OUTPATIENT)
Dept: FAMILY MEDICINE CLINIC | Facility: CLINIC | Age: 56
End: 2021-03-22
Payer: COMMERCIAL

## 2021-03-22 VITALS
OXYGEN SATURATION: 98 % | SYSTOLIC BLOOD PRESSURE: 132 MMHG | TEMPERATURE: 97.8 F | DIASTOLIC BLOOD PRESSURE: 78 MMHG | HEIGHT: 64 IN | WEIGHT: 138 LBS | HEART RATE: 104 BPM | BODY MASS INDEX: 23.56 KG/M2

## 2021-03-22 DIAGNOSIS — F33.0 MILD EPISODE OF RECURRENT MAJOR DEPRESSIVE DISORDER (HCC): Primary | ICD-10-CM

## 2021-03-22 DIAGNOSIS — Z23 NEED FOR TETANUS BOOSTER: Primary | ICD-10-CM

## 2021-03-22 DIAGNOSIS — K21.9 GASTROESOPHAGEAL REFLUX DISEASE: ICD-10-CM

## 2021-03-22 PROCEDURE — 99213 OFFICE O/P EST LOW 20 MIN: CPT | Performed by: FAMILY MEDICINE

## 2021-03-22 PROCEDURE — 90715 TDAP VACCINE 7 YRS/> IM: CPT

## 2021-03-22 PROCEDURE — 90471 IMMUNIZATION ADMIN: CPT

## 2021-03-22 RX ORDER — GLATIRAMER 40 MG/ML
INJECTION, SOLUTION SUBCUTANEOUS
COMMUNITY
Start: 2021-03-09 | End: 2022-02-02 | Stop reason: SDUPTHER

## 2021-03-22 RX ORDER — CITALOPRAM 20 MG/1
20 TABLET ORAL DAILY
Qty: 30 TABLET | Refills: 0 | Status: SHIPPED | OUTPATIENT
Start: 2021-03-22 | End: 2021-05-03

## 2021-03-22 RX ORDER — OMEPRAZOLE 40 MG/1
40 CAPSULE, DELAYED RELEASE ORAL DAILY
Qty: 90 CAPSULE | Refills: 3 | Status: SHIPPED | OUTPATIENT
Start: 2021-03-22 | End: 2022-02-02 | Stop reason: SDUPTHER

## 2021-03-22 NOTE — PROGRESS NOTES
Assessment/Plan:    Saud Mensah is a 60years old female with past medical history of depression, multiple sclerosis presents today for follow-up visit  Symptoms of depression over past one month  Restart Celexa at lower dose 20 mg PO daily  Discussed with patient will follow up in one month for medication response and may titrate up dosage as needed  Patient agreed with plan  F/u in one month  Case d/w Dr Demetris Arroyo  Diagnoses and all orders for this visit:    Mild episode of recurrent major depressive disorder (HCC)  -     citalopram (CeleXA) 20 mg tablet; Take 1 tablet (20 mg total) by mouth daily  -     Lipid panel; Future  -     Comprehensive metabolic panel; Future    Gastroesophageal reflux disease  -     omeprazole (PriLOSEC) 40 MG capsule; Take 1 capsule (40 mg total) by mouth daily    Other orders  -     Glatiramer Acetate 40 MG/ML SOSY        Subjective:      Patient ID: Saud Mensah is a 60 y o  female  HPI     Patient is a 60years old female with past medical history of depression, multiple sclerosis presents today for follow-up visit  Patient reports symptoms of depressed mood, anxious, lack of energy, lack of concentration, insomnia, [facility]  Patient has history of depression and previously was on Celexa 40 mg p o  Daily  The medication was titrated off since January 2020 with improvement of symptoms  Denied SI/HI  Patient also reports intermittent palpitations when she feels nervous or anxious  For her MS, she has not have any repeat neuro imaging study  Patient denied any paresthesias, weakness, dizziness, sensory loss  Patient does follow up with opthalmology for her right eye          The following portions of the patient's history were reviewed and updated as appropriate: allergies, current medications, past family history, past medical history, past social history, past surgical history and problem list     Review of Systems   Constitutional: Negative for chills and fever  HENT: Negative for congestion, rhinorrhea and sore throat  Eyes: Negative for visual disturbance  Respiratory: Negative for cough and shortness of breath  Cardiovascular: Negative for chest pain and palpitations  Gastrointestinal: Negative for abdominal pain, nausea and vomiting  Genitourinary: Negative for dysuria  Musculoskeletal: Negative for back pain  Skin: Negative for color change  Neurological: Negative for dizziness and headaches  Hematological: Does not bruise/bleed easily  Psychiatric/Behavioral: Positive for decreased concentration, dysphoric mood and sleep disturbance  Negative for confusion, hallucinations and suicidal ideas  The patient is nervous/anxious  Objective:      /78   Pulse 104   Temp 97 8 °F (36 6 °C)   Ht 5' 4" (1 626 m)   Wt 62 6 kg (138 lb)   SpO2 98%   BMI 23 69 kg/m²          Physical Exam  Vitals signs and nursing note reviewed  Constitutional:       General: She is not in acute distress  Appearance: She is well-developed  HENT:      Head: Normocephalic  Mouth/Throat:      Pharynx: No oropharyngeal exudate  Eyes:      General: No scleral icterus  Right eye: No discharge  Left eye: No discharge  Conjunctiva/sclera: Conjunctivae normal    Neck:      Musculoskeletal: Normal range of motion  Cardiovascular:      Rate and Rhythm: Normal rate and regular rhythm  Heart sounds: Normal heart sounds  No murmur  Pulmonary:      Effort: Pulmonary effort is normal  No respiratory distress  Breath sounds: Normal breath sounds  No wheezing  Abdominal:      General: Bowel sounds are normal  There is no distension  Palpations: Abdomen is soft  Tenderness: There is no abdominal tenderness  Musculoskeletal:         General: No tenderness  Lymphadenopathy:      Cervical: No cervical adenopathy  Skin:     Findings: No erythema     Neurological:      Mental Status: She is alert and oriented to person, place, and time     Psychiatric:         Behavior: Behavior normal       Comments: Depressed mood

## 2021-04-10 ENCOUNTER — NURSE TRIAGE (OUTPATIENT)
Dept: OTHER | Facility: OTHER | Age: 56
End: 2021-04-10

## 2021-04-10 NOTE — TELEPHONE ENCOUNTER
Reason for Disposition   [1] Red or very tender (to touch) area AND [2] getting larger over 48 hours after the bite    Answer Assessment - Initial Assessment Questions  1  TYPE of INSECT: "What type of insect was it?"       Unsure  2  ONSET: "When did you get bitten?"       4/8  3  LOCATION: "Where is the insect bite located?"       Left upper arm  4  REDNESS: "Is the area red or pink?" If so, ask "What size is area of redness?" (inches or cm)  "When did the redness start?"      Red, 4 inches  5  PAIN: "Is there any pain?" If so, ask: "How bad is it?"  (Scale 1-10; or mild, moderate, severe)      Mild  6  ITCHING: "Does it itch?" If so, ask: "How bad is the itch?"     - MILD: doesn't interfere with normal activities    - MODERATE-SEVERE: interferes with work, school, sleep, or other activities       Severe  7  SWELLING: "How big is the swelling?" (inches, cm, or compare to coins)      Mild swelling, warm  8   OTHER SYMPTOMS: "Do you have any other symptoms?"  (e g , difficulty breathing, hives)      Denies    Protocols used: INSECT BITE-ADULT-

## 2021-04-10 NOTE — TELEPHONE ENCOUNTER
Regarding: Cellulitis  ----- Message from Larissa Mccurdy RN sent at 4/10/2021  1:21 PM EDT -----  "I'm wondering if the doctor can call in a prescription for cellulitis  I don't get hot or sore  I get itchy  I've been itchy for days  I get it every few years  I haven't had it in a long time  It's on the front of my left upper arm   It looks like I may have gotten a bite "

## 2021-04-12 ENCOUNTER — TELEPHONE (OUTPATIENT)
Dept: FAMILY MEDICINE CLINIC | Facility: CLINIC | Age: 56
End: 2021-04-12

## 2021-04-12 DIAGNOSIS — L03.119 CELLULITIS OF UPPER EXTREMITY, UNSPECIFIED LATERALITY: Primary | ICD-10-CM

## 2021-04-12 RX ORDER — AMOXICILLIN AND CLAVULANATE POTASSIUM 875; 125 MG/1; MG/1
1 TABLET, FILM COATED ORAL EVERY 12 HOURS SCHEDULED
Qty: 20 TABLET | Refills: 0 | Status: SHIPPED | OUTPATIENT
Start: 2021-04-12 | End: 2021-04-20 | Stop reason: ALTCHOICE

## 2021-04-13 ENCOUNTER — TELEPHONE (OUTPATIENT)
Dept: FAMILY MEDICINE CLINIC | Facility: CLINIC | Age: 56
End: 2021-04-13

## 2021-04-13 DIAGNOSIS — L03.119 CELLULITIS OF UPPER EXTREMITY, UNSPECIFIED LATERALITY: Primary | ICD-10-CM

## 2021-04-13 RX ORDER — CEPHALEXIN 500 MG/1
500 CAPSULE ORAL EVERY 8 HOURS SCHEDULED
Qty: 21 CAPSULE | Refills: 0 | Status: SHIPPED | OUTPATIENT
Start: 2021-04-13 | End: 2021-04-16

## 2021-04-20 ENCOUNTER — OFFICE VISIT (OUTPATIENT)
Dept: FAMILY MEDICINE CLINIC | Facility: CLINIC | Age: 56
End: 2021-04-20
Payer: COMMERCIAL

## 2021-04-20 VITALS
SYSTOLIC BLOOD PRESSURE: 126 MMHG | HEART RATE: 72 BPM | BODY MASS INDEX: 23.56 KG/M2 | WEIGHT: 138 LBS | TEMPERATURE: 97.3 F | HEIGHT: 64 IN | OXYGEN SATURATION: 98 % | DIASTOLIC BLOOD PRESSURE: 78 MMHG

## 2021-04-20 DIAGNOSIS — F33.1 MODERATE EPISODE OF RECURRENT MAJOR DEPRESSIVE DISORDER (HCC): Primary | ICD-10-CM

## 2021-04-20 PROCEDURE — 1036F TOBACCO NON-USER: CPT | Performed by: FAMILY MEDICINE

## 2021-04-20 PROCEDURE — 99213 OFFICE O/P EST LOW 20 MIN: CPT | Performed by: FAMILY MEDICINE

## 2021-04-20 PROCEDURE — 3008F BODY MASS INDEX DOCD: CPT | Performed by: FAMILY MEDICINE

## 2021-04-20 NOTE — PROGRESS NOTES
Assessment/Plan:    Saman Martinez is a 60year old female  with past medical history of depression, multiple sclerosis presents today for follow-up visit  Her symptoms of depression improved with Celexa 20 mg PO daily  Continue current dosage at this time  She will finish remaining antibiotic course for left upper arm cellulitis  F/u in 3 months  Case d/w Dr Freedom Koehler  Diagnoses and all orders for this visit:    Moderate episode of recurrent major depressive disorder ([facility] [address] )        Subjective:      Patient ID: Saman Martinez is a 60 y o  female  HPI     Saman Martinez is a 60years old female with past medical history of depression, multiple sclerosis presents today for follow-up visit  Patient was restarted on Celexa 20 mg PO daily for recurrent MDD  She reports her mood has improved, able to concentrate better, less anxious, and improved energy level  She feels the current dosage is working well for her  Denied any SI/HI  Recent lab works reviewed with patient  Patient reports recent cellulitis at left axilla initially taken Augmentin but felt sick after one day use  Antibiotic was changed to Keflex 500 mg Q8H for 7 doses  Redness at left axilla has improved with mild pruritus  Denied any lymphadenopathy, swelling  Denied any fever, chills  The following portions of the patient's history were reviewed and updated as appropriate: allergies, current medications, past family history, past medical history, past social history, past surgical history and problem list     Review of Systems   Constitutional: Negative for chills and fever  HENT: Negative for ear pain and sore throat  Eyes: Negative for pain and visual disturbance  Respiratory: Negative for cough and shortness of breath  Cardiovascular: Negative for chest pain and palpitations  Gastrointestinal: Negative for abdominal pain and vomiting  Genitourinary: Negative for dysuria and hematuria     Musculoskeletal: Negative for arthralgias and back pain  Skin: Positive for color change (Left upper inner arm redness)  Negative for rash  Neurological: Negative for seizures and syncope  All other systems reviewed and are negative  Objective:      /78   Pulse 72   Temp (!) 97 3 °F (36 3 °C)   Ht 5' 4" (1 626 m)   Wt 62 6 kg (138 lb)   SpO2 98%   BMI 23 69 kg/m²          Physical Exam  Vitals signs and nursing note reviewed  Constitutional:       General: She is not in acute distress  Appearance: She is well-developed  HENT:      Head: Normocephalic  Mouth/Throat:      Pharynx: No oropharyngeal exudate  Eyes:      General: No scleral icterus  Right eye: No discharge  Left eye: No discharge  Conjunctiva/sclera: Conjunctivae normal    Neck:      Musculoskeletal: Normal range of motion  Cardiovascular:      Rate and Rhythm: Normal rate and regular rhythm  Heart sounds: Normal heart sounds  No murmur  Pulmonary:      Effort: Pulmonary effort is normal  No respiratory distress  Breath sounds: Normal breath sounds  No wheezing  Abdominal:      General: Bowel sounds are normal  There is no distension  Palpations: Abdomen is soft  Tenderness: There is no abdominal tenderness  Musculoskeletal:         General: No tenderness  Lymphadenopathy:      Cervical: No cervical adenopathy  Skin:     Comments: Mild erythema at left upper inner arm without tenderness or red streak  No left axillary lymphadenopathy  Neurological:      Mental Status: She is alert and oriented to person, place, and time     Psychiatric:         Behavior: Behavior normal

## 2021-05-03 DIAGNOSIS — F33.0 MILD EPISODE OF RECURRENT MAJOR DEPRESSIVE DISORDER (HCC): ICD-10-CM

## 2021-05-03 RX ORDER — CITALOPRAM 20 MG/1
20 TABLET ORAL DAILY
Qty: 30 TABLET | Refills: 0 | Status: SHIPPED | OUTPATIENT
Start: 2021-05-03 | End: 2021-06-21

## 2021-06-21 DIAGNOSIS — F33.0 MILD EPISODE OF RECURRENT MAJOR DEPRESSIVE DISORDER (HCC): ICD-10-CM

## 2021-06-21 RX ORDER — CITALOPRAM 20 MG/1
20 TABLET ORAL DAILY
Qty: 30 TABLET | Refills: 0 | Status: SHIPPED | OUTPATIENT
Start: 2021-06-21 | End: 2022-02-02 | Stop reason: ALTCHOICE

## 2021-07-06 ENCOUNTER — TELEPHONE (OUTPATIENT)
Dept: FAMILY MEDICINE CLINIC | Facility: CLINIC | Age: 56
End: 2021-07-06

## 2021-07-06 ENCOUNTER — TELEPHONE (OUTPATIENT)
Dept: OTHER | Facility: OTHER | Age: 56
End: 2021-07-06

## 2021-07-06 DIAGNOSIS — R39.9 UTI SYMPTOMS: Primary | ICD-10-CM

## 2021-07-06 RX ORDER — CIPROFLOXACIN 250 MG/1
250 TABLET, FILM COATED ORAL EVERY 12 HOURS SCHEDULED
Qty: 6 TABLET | Refills: 0 | Status: SHIPPED | OUTPATIENT
Start: 2021-07-06 | End: 2021-07-09

## 2021-07-06 NOTE — TELEPHONE ENCOUNTER
Pt states that she has an urinary tract infection and would like it if the doctor can call her in an abx

## 2021-07-09 ENCOUNTER — RA CDI HCC (OUTPATIENT)
Dept: OTHER | Facility: HOSPITAL | Age: 56
End: 2021-07-09

## 2021-07-10 NOTE — PROGRESS NOTES
[facility]  coding opportunities          Chart reviewed, no opportunity found: CHART REVIEWED, NO OPPORTUNITY FOUND                     Patients insurance company: [facility] (Medicare Advantage and Commercial)

## 2021-07-19 ENCOUNTER — OFFICE VISIT (OUTPATIENT)
Dept: FAMILY MEDICINE CLINIC | Facility: CLINIC | Age: 56
End: 2021-07-19
Payer: COMMERCIAL

## 2021-07-19 VITALS
BODY MASS INDEX: 23.56 KG/M2 | TEMPERATURE: 98 F | WEIGHT: 138 LBS | SYSTOLIC BLOOD PRESSURE: 118 MMHG | OXYGEN SATURATION: 99 % | DIASTOLIC BLOOD PRESSURE: 68 MMHG | HEIGHT: 64 IN | HEART RATE: 75 BPM

## 2021-07-19 DIAGNOSIS — F32.A FATIGUE DUE TO DEPRESSION: ICD-10-CM

## 2021-07-19 DIAGNOSIS — R52 PAIN: Primary | ICD-10-CM

## 2021-07-19 DIAGNOSIS — R53.83 FATIGUE DUE TO DEPRESSION: ICD-10-CM

## 2021-07-19 DIAGNOSIS — F33.1 MODERATE EPISODE OF RECURRENT MAJOR DEPRESSIVE DISORDER (HCC): ICD-10-CM

## 2021-07-19 DIAGNOSIS — G35 MULTIPLE SCLEROSIS (HCC): ICD-10-CM

## 2021-07-19 PROCEDURE — 99214 OFFICE O/P EST MOD 30 MIN: CPT | Performed by: FAMILY MEDICINE

## 2021-07-19 PROCEDURE — 3008F BODY MASS INDEX DOCD: CPT | Performed by: FAMILY MEDICINE

## 2021-07-19 PROCEDURE — 1036F TOBACCO NON-USER: CPT | Performed by: FAMILY MEDICINE

## 2021-07-19 PROCEDURE — 3725F SCREEN DEPRESSION PERFORMED: CPT | Performed by: FAMILY MEDICINE

## 2021-07-19 NOTE — PROGRESS NOTES
Assessment/Plan:    Multiple sclerosis (HCC)  Stable  Continue current  Major depressive disorder  Stable  Continue current  Fatigue due to depression  Stable  Continue current  Diagnoses and all orders for this visit:    Pain  -     XR sacrum and coccyx; Future    Multiple sclerosis (HCC)    Fatigue due to depression    Moderate episode of recurrent major depressive disorder (HCC)          Subjective:      Patient ID: Janay Martins is a 60 y o  female  She is having pain in the tailbone for several weeks  She denies trauma  She did sit on a rubber swimming mask at a pool prior to it starting, however  She has MS, but has not had extensive steroid history  She has no F/C  Her MS is currently quiet  She has major depression  She seems to be doing well on Celexa  The following portions of the patient's history were reviewed and updated as appropriate:   She  has no past medical history on file  She   Patient Active Problem List    Diagnosis Date Noted    Multiple sclerosis ([facility] [address] ) 2021    BMI 25 0-25 9,adult 2020    Fatigue due to depression 2019    Major depressive disorder 2018    Screening for diabetes mellitus (DM) 2018    Screening for ischemic heart disease 2018    Encounter for hepatitis C screening test for low risk patient 2018    Acute cystitis with hematuria 2018     She  has a past surgical history that includes  section; Knee arthroscopy; and Vaginal hysterectomy  Her family history includes Coronary artery disease in her father; Diabetes in her mother; Hyperlipidemia in her mother; Hypertension in her mother; Osteoporosis in her mother; Other in her mother  She  reports that she has quit smoking  She has never used smokeless tobacco  She reports current alcohol use  No history on file for drug use    Current Outpatient Medications   Medication Sig Dispense Refill    citalopram (CeleXA) 20 mg tablet TAKE 1 TABLET (20 MG TOTAL) BY MOUTH DAILY 30 tablet 0    glatiramer acetate (COPAXONE) 20 mg/mL SOSY injection syringe Inject under the skin      Glatiramer Acetate 40 MG/ML SOSY       ibuprofen (MOTRIN) 200 mg tablet Take 200 mg by mouth every 6 (six) hours as needed      omeprazole (PriLOSEC) 40 MG capsule Take 1 capsule (40 mg total) by mouth daily 90 capsule 3    valACYclovir (VALTREX) 1,000 mg tablet Take 2 tablets with onset of cold sore symptoms  2 tablet 5     No current facility-administered medications for this visit  Current Outpatient Medications on File Prior to Visit   Medication Sig    citalopram (CeleXA) 20 mg tablet TAKE 1 TABLET (20 MG TOTAL) BY MOUTH DAILY    glatiramer acetate (COPAXONE) 20 mg/mL SOSY injection syringe Inject under the skin    Glatiramer Acetate 40 MG/ML SOSY     ibuprofen (MOTRIN) 200 mg tablet Take 200 mg by mouth every 6 (six) hours as needed    omeprazole (PriLOSEC) 40 MG capsule Take 1 capsule (40 mg total) by mouth daily    valACYclovir (VALTREX) 1,000 mg tablet Take 2 tablets with onset of cold sore symptoms   [DISCONTINUED] FLUARIX QUADRIVALENT 0 5 ML MARCIAL      No current facility-administered medications on file prior to visit  She is allergic to augmentin [amoxicillin-pot clavulanate]       Review of Systems      Objective:      /68 (BP Location: Left arm, Patient Position: Sitting, Cuff Size: Standard)   Pulse 75   Temp 98 °F (36 7 °C)   Ht 5' 4" (1 626 m)   Wt 62 6 kg (138 lb)   SpO2 99%   BMI 23 69 kg/m²          Physical Exam

## 2022-01-17 ENCOUNTER — TELEPHONE (OUTPATIENT)
Dept: ADMINISTRATIVE | Facility: OTHER | Age: 57
End: 2022-01-17

## 2022-01-18 NOTE — TELEPHONE ENCOUNTER
Upon review of the In Basket request we were able to locate, review, and update the patient chart as requested for Mammogram     Any additional questions or concerns should be emailed to the Practice Liaisons via [email or web address] email, please do not reply via In Basket      Thank you  Teresa Hall

## 2022-01-18 NOTE — TELEPHONE ENCOUNTER
----- Message from Shayna Lance MA sent at 1/17/2022  2:57 PM EST -----  Regarding: care gap request  01/17/22 2:57 PM    Hello, our patient attached above has had Mammogram completed/performed  Please assist in updating the patient chart by pulling the Care Everywhere (CE) document  The date of service is 2022       Thank you,  Shayna Lance MA  [facility]

## 2022-02-02 ENCOUNTER — OFFICE VISIT (OUTPATIENT)
Dept: FAMILY MEDICINE CLINIC | Facility: CLINIC | Age: 57
End: 2022-02-02
Payer: COMMERCIAL

## 2022-02-02 VITALS
TEMPERATURE: 98.8 F | WEIGHT: 142 LBS | SYSTOLIC BLOOD PRESSURE: 102 MMHG | DIASTOLIC BLOOD PRESSURE: 60 MMHG | RESPIRATION RATE: 18 BRPM | HEIGHT: 64 IN | HEART RATE: 78 BPM | BODY MASS INDEX: 24.24 KG/M2 | OXYGEN SATURATION: 98 %

## 2022-02-02 DIAGNOSIS — G35 MULTIPLE SCLEROSIS (HCC): ICD-10-CM

## 2022-02-02 DIAGNOSIS — K21.9 GASTROESOPHAGEAL REFLUX DISEASE: ICD-10-CM

## 2022-02-02 DIAGNOSIS — Z23 NEEDS FLU SHOT: ICD-10-CM

## 2022-02-02 DIAGNOSIS — M75.41 IMPINGEMENT SYNDROME OF RIGHT SHOULDER: Primary | ICD-10-CM

## 2022-02-02 DIAGNOSIS — M77.8 RIGHT SHOULDER TENDONITIS: ICD-10-CM

## 2022-02-02 DIAGNOSIS — R79.9 ABNORMAL FINDING OF BLOOD CHEMISTRY, UNSPECIFIED: ICD-10-CM

## 2022-02-02 DIAGNOSIS — E55.9 VITAMIN D DEFICIENCY: ICD-10-CM

## 2022-02-02 DIAGNOSIS — M79.10 MYALGIA: ICD-10-CM

## 2022-02-02 DIAGNOSIS — Z13.220 SCREENING, LIPID: ICD-10-CM

## 2022-02-02 DIAGNOSIS — Z11.4 SCREENING FOR HIV WITHOUT PRESENCE OF RISK FACTORS: ICD-10-CM

## 2022-02-02 PROBLEM — Z13.6 SCREENING FOR ISCHEMIC HEART DISEASE: Status: RESOLVED | Noted: 2018-12-13 | Resolved: 2022-02-02

## 2022-02-02 PROBLEM — N30.01 ACUTE CYSTITIS WITH HEMATURIA: Status: RESOLVED | Noted: 2018-01-29 | Resolved: 2022-02-02

## 2022-02-02 PROBLEM — Z13.1 SCREENING FOR DIABETES MELLITUS (DM): Status: RESOLVED | Noted: 2018-12-13 | Resolved: 2022-02-02

## 2022-02-02 PROBLEM — Z11.59 ENCOUNTER FOR HEPATITIS C SCREENING TEST FOR LOW RISK PATIENT: Status: RESOLVED | Noted: 2018-12-13 | Resolved: 2022-02-02

## 2022-02-02 PROCEDURE — 99214 OFFICE O/P EST MOD 30 MIN: CPT | Performed by: FAMILY MEDICINE

## 2022-02-02 PROCEDURE — 3008F BODY MASS INDEX DOCD: CPT | Performed by: FAMILY MEDICINE

## 2022-02-02 PROCEDURE — 1036F TOBACCO NON-USER: CPT | Performed by: FAMILY MEDICINE

## 2022-02-02 PROCEDURE — 90682 RIV4 VACC RECOMBINANT DNA IM: CPT | Performed by: FAMILY MEDICINE

## 2022-02-02 PROCEDURE — G0008 ADMIN INFLUENZA VIRUS VAC: HCPCS | Performed by: FAMILY MEDICINE

## 2022-02-02 RX ORDER — OMEPRAZOLE 40 MG/1
40 CAPSULE, DELAYED RELEASE ORAL EVERY MORNING
Qty: 90 CAPSULE | Refills: 3 | Status: SHIPPED | OUTPATIENT
Start: 2022-02-02

## 2022-02-02 NOTE — PATIENT INSTRUCTIONS
Rotator Cuff Injury Exercises  AMBULATORY CARE:   What you need to know about rotator cuff injury exercises:  Exercises help improve shoulder movement and strength, and decrease pain  Your physical therapist or healthcare provider will tell you when to start doing the exercises  He or she will tell you how often to do them  You will need to start slowly to prevent more injury  You will move through several levels over time as you get stronger and more flexible  Safety guidelines:   · Always warm up before you do the exercises  Walk or ride a stationary bike for 5 to 10 minutes to help you warm up  · Do not put your arm over your head until directed  You will need to wait until your injury has healed  The movement of some exercises could continue until your arm is over your head  You will need to stop the movement where directed  · Stop if you feel pain  You may feel some tight or stiff areas when you start  This should get better as you continue the exercises  You should not feel pain  Pain means you are not ready to do the exercise yet  Stop and call your physical therapist or healthcare provider right away  · Always work both rotator cuffs  Even if your injury is only on 1 side, it is important to do each exercise on both sides  This helps prevent injury and maintain balance in your shoulders and back  · Posture is important  Your physical therapist or healthcare provider will show you the proper posture for each exercise  You will be shown how to pull your shoulders back and down to engage the correct muscles  Remember not to let your shoulders shrug during an exercise unless it is part of the movement  How to do stretching exercises: You will not feel every exercise in your shoulder area  You may feel some of the stretches in your back, side, or upper arm muscles  You need to work muscles in and around your rotator cuffs and down your arms  This helps stabilize your shoulders   Your physical therapist or healthcare provider will tell you how long to hold each stretch  He or she will also tell you how many times to repeat each stretch during an exercise session  You may be told to do only certain exercises, or to do them in a specific order  The following are general directions to help you remember the exercises you are taught:  · Pendulum swings:  Lean forward and rest your arm on a table  Do not round your back or lock your knees during the exercise  Let your other arm hang freely by your side  Gently swing your free arm forward and backward, side to side, and in circles  · Crossover arm stretch:  Relax your shoulders  Hold your upper arm with the opposite hand  Pull your arm across your chest until you feel a stretch  Hold the stretch  Return to the starting position  · Sleeper stretch:  Lie on your side on a firm, flat surface  Bend the elbow of your top arm 90°  Use your other hand to slowly push your arm down  Stop when you feel a stretch at the back of your shoulder  Hold the stretch  Slowly return to the starting position  · Shoulder movement, up and down: This exercise may also be called shoulder extension  Sit in a chair that has a back but no armrests  Raise your arm like you are reaching for the wall in front of you  Continue to raise the arm until it is over your head, or as high as directed  Bring your arm back down to your side  Bring it back as far as possible behind your body  Return your arm to the starting position  · Shoulder movement, side to side: These movements may be called flexion, internal rotation, and external rotation  Sit in a chair that has a back but no armrests  Raise your arm to the side and then up over your head as far as directed  Return your arm to your side  Bring your arm across the front of your body and reach for the opposite shoulder  Return your arm to the starting position           · Shoulder rolls:  Stand and raise both shoulders toward your ears  Lower them to the starting position  Relax your shoulders  Pull your shoulders back  Then relax them again  Roll your shoulders in a smooth [place]  Then roll your shoulders in a smooth [place] in the other direction  · Wall reach exercise: This may also be called a flexion stretch  Stand facing a wall  Slowly walk your fingers up the wall until you feel a stretch  Hold the stretch  Return to the starting position  · Arm reach exercise:  Lie on your back with your legs straight  Reach your arms like you are trying to touch the ceiling  Reach as high as you can so you feel a stretch in the back of your arms  Hold the stretch  Then lower your arms to your sides  · Elbow bends:  Stand with your arms down to your sides  Keep your palm facing forward  Bend your elbow and try to touch your shoulder with your fingertips  Return your arm to the starting position  · Up the back stretch:  Stand and put both arms behind your back  Put one hand under the other  Move the bottom hand and slowly push the upper hand up toward your head  You should feel a stretch in the front of your arm and shoulder  Be careful not to push too hard  Hold the stretch  Then return to the starting position  · Triceps stretch:  Stand and drop your forearm down your back so your hand is pointing to the ground behind you  Your elbow should be pointing at the ceiling  Take your other hand and place it on your elbow  Gently and slowly push on the elbow until you feel a stretch in the back of your arm  Hold the stretch  Let go of your elbow and relax your arm  You may be shown how to do this stretch with a towel  The towel can be pulled gently with a hand behind your back at waist level  How to do strengthening exercises:  Strengthening exercises may include handheld weights or resistance bands   Your physical therapist or healthcare provider will tell you how much weight or resistance to use  The general guide is to use light weights or low resistance and to do a high number of repetitions  You may be told to do only certain exercises, or to do them in a specific order  The following are general directions to help you remember the exercises you are taught:  · Scapular squeeze:  Stand with your arms at your sides  Squeeze your shoulder blades together and hold this position  Then relax the muscles  Keep your shoulder back during the entire exercise  · Wall pushups: This exercise is similar to a pushup done on the ground  The goal is to use your back and shoulder muscles to bring your upper body toward and away from the wall  Stand facing a wall  Put your hands on the wall  Bend your elbows to bring your upper body toward the wall  Straighten your arms to return to the starting position  Keep your feet close enough to the wall that you do not take a step when you bend your elbows  · Standing row with exercise band:  Wrap the exercise band around a heavy, stable object at waist level  Make loop in the end of the band to create a handle, if needed  Hold the handle or loop and pull the band straight back toward your hip  Keep your shoulder down  Squeeze your shoulder blade  Hold this position  Then slowly return to the starting position  · External rotation with arm abducted 90 degrees:  Wrap the exercise band around a heavy, stable object at waist level  Make loop in the end of the band to create a handle, if needed  Stand and hold the handle or loop  Bend your elbow and raise your arm to shoulder height  Keep your arm in this position  Raise your hand like you are pointing at the ceiling  Slowly return to the starting position  You may also be shown how to do this exercise lying down and with a weight  · Shoulder abduction with weight:  Stand and hold a weight in your hand with your palm facing your body   Slowly raise your arm to the side with your thumb pointing up  Then raise your arm as far as you can without pain  Hold this position  Then return to the starting position  · Shoulder abduction with exercise band:  Wrap the exercise band around a heavy, stable object near your foot  Grab the band  Keep your arm straight  Slowly raise your arm to the side with your thumb pointing up  Then, slowly pull the band as far as you can without pain  Slowly return to the starting position  Shoulder Impingement Syndrome   WHAT YOU NEED TO KNOW:   The most common type of shoulder impingement syndrome is subacromial impingement syndrome (SIS)  SIS happens when the tendons or bursa become trapped between bones of your shoulder joint  You have pain when you reach over your head or lie on the shoulder while sleeping  You may also have pain when you move your arm out to the side or behind your body  DISCHARGE INSTRUCTIONS:   Return to the emergency department if:   · Your shoulder, arm, hand, or fingers turn bluish or pale, or feel cold or numb  · Your pain gets worse, even after rest and medicine  · You cannot move your fingers  Call your doctor if:   · You have questions or concerns about your condition or care  Medicines: You may  be given any of the following:  · Acetaminophen  decreases pain  It is available without a doctor's order  Ask how much to take and how often to take it  Follow directions  Read the labels of all other medicines you are using to see if they also contain acetaminophen, or ask your doctor or pharmacist  Acetaminophen can cause liver damage if not taken correctly  Do not use more than 4 grams (4,000 milligrams) total of acetaminophen in one day  · NSAIDs,  such as ibuprofen, help decrease swelling and pain  This medicine is available with or without a doctor's order  NSAIDs can cause stomach bleeding or kidney problems in certain people   If you take blood thinner medicine, always ask your healthcare provider if NSAIDs are safe for you  Always read the medicine label and follow directions  · Take your medicine as directed  Contact your healthcare provider if you think your medicine is not helping or if you have side effects  Tell him or her if you are allergic to any medicine  Keep a list of the medicines, vitamins, and herbs you take  Include the amounts, and when and why you take them  Bring the list or the pill bottles to follow-up visits  Carry your medicine list with you in case of an emergency  Rest  will help your shoulder pain  Limit reaching above your shoulder's height  Apply ice  on your shoulder for 20 minutes 1 or 2 times in a day  Use an ice pack, or put crushed ice in a plastic bag  You may also use a bag of frozen peas or corn  Cover the bag with a towel  Ice decreases swelling and pain  Follow up with your doctor and physical therapist as directed:  Write down your questions so you remember to ask them during your visits  © Copyright [employer] 2021 Information is for End User's use only and may not be sold, redistributed or otherwise used for commercial purposes  All illustrations and images included in CareNotes® are the copyrighted property of [employer]  or [employer]yley Ruiz   The above information is an  only  It is not intended as medical advice for individual conditions or treatments  Talk to your doctor, nurse or pharmacist before following any medical regimen to see if it is safe and effective for you

## 2022-02-02 NOTE — PROGRESS NOTES
Assessment/Plan:    No problem-specific Assessment & Plan notes found for this encounter  {Assess/PlanSmartLinks:45902}      Chief Complaint   Patient presents with    Follow-up     flu shot, omeprazole script, would like to discuss covid booster timing    Shoulder Problem     would like to evaluate shoulder - sat back in bed last week and for four days following that, had noticeable limitations in ROM  Severe pain during that time but pain today is a 1       Subjective:      Patient ID: Julio Cesar Najera is a 60 y o  female      HPI    {Common ambulatory SmartLinks:52130}    Review of Systems      Objective:      /60   Pulse 78   Temp 98 8 °F (37 1 °C)   Resp 18   Ht 5' 4" (1 626 m)   Wt 64 4 kg (142 lb)   SpO2 98%   BMI 24 37 kg/m²          Physical Exam

## 2022-02-02 NOTE — PROGRESS NOTES
Assessment/Plan:    1  Gastroesophageal reflux disease  - will cont PPI therapy  She is doing well with this  - omeprazole (PriLOSEC) 40 MG capsule; Take 1 capsule (40 mg total) by mouth every morning  Dispense: 90 capsule; Refill: 3  - Lipid Panel with Direct LDL reflex; Future  - Comprehensive metabolic panel; Future    2  Multiple sclerosis ([facility] [address] )  - follows with Neurology and is on Glatiramer  She reports that she is overall doing stable  3  Screening for HIV without presence of risk factors  - HIV 1/2 Antigen/Antibody (4th Generation) w Reflex [facility]; Future    4  Screening, lipid  - Lipid Panel with Direct LDL reflex; Future  - Comprehensive metabolic panel; Future    5  Myalgia  - RLE on occasion -- cramps  Always has a bit more swelling on the R compared to the L  She does feel she stays hydrated  Cannot do gatorades, they worsen her reflux  We will check her electrolytes and reiterate importance of drinking 64 ounces of water/day  - Magnesium; Future    6  Vitamin D deficiency  - Vitamin D 25 hydroxy; Future    7  Abnormal finding of blood chemistry, unspecified   - Lipid Panel with Direct LDL reflex; Future    8  Impingement syndrome of right shoulder/9  Right shoulder tendonitis  - she is taking anti-inflammatory  I asked her to take this daily for one week and do daily ice 15 min on/off --> this is therapeutic to help with inflammation  She is showing improvement already over the course of one week  HEP given to her as well today  No imaging necessary at this point  10  Needs flu shot  - influenza vaccine, quadrivalent, recombinant, PF, 0 5 mL, for patients 18 yr+ (FLUBLOK)    Needs [facility] visit - can get this scheduled  Patient/Caretaker verbalized understanding and were in agreement with today's assessment and plan  Time was taken to address any questions patient/caretaker had        Indication/Risks/Benefits of medication(s) as prescribed were discussed with the patient/caretaker  The patient verbalized understanding and agreement and elects to take medications as prescribed  Time was taken to answer any questions the patient/caretaker may have had  Subjective:      Patient ID: Nas August is a 60 y o  female  Patient with h/o MS on immunosuppressive agent and sees Neurology  She is asking about the potential need for a 4th Covid vaccine  She has had Moderna  Would like flu shot today  H/o shoulder problems  She is acute c/o R shoulder pain  Boosted herself back in bed last week and since then is having trouble with ROM  This has already gotten a bit better since this time but still struggling with this a bit  Has not had XR in the past for this  No pain per say  She is R hand dominant  There are times the L should wakes her up at night if she rolls over on it  Takes [place] on occasion for her inflammation  Has been icing but stopped -- feels this helped a bit  She denies overt weakness of the shoulder  She is not dropping things  GERD - on PPI  No issues with vomiting or her bowels  No bloody  She avoids triggers  Does not report SEs of PPI therapy  The following portions of the patient's history were reviewed and updated as appropriate: allergies, current medications, past family history, past medical history, past social history, past surgical history and problem list     Review of Systems   Constitutional: Negative for activity change and fatigue  HENT: Negative for congestion and sore throat  Respiratory: Negative for shortness of breath  Cardiovascular: Positive for leg swelling (RLE at baseline )  Negative for chest pain  Gastrointestinal: Negative for abdominal pain  Musculoskeletal: Positive for arthralgias and myalgias  Skin: Negative for rash  Neurological: Negative for weakness  Psychiatric/Behavioral: The patient is not nervous/anxious            Objective:    /60   Pulse 78   Temp 98 8 °F (37 1 °C)   Resp 18   Ht 5' 4" (1 626 m)   Wt 64 4 kg (142 lb)   SpO2 98%   BMI 24 37 kg/m²        Physical Exam  Vitals and nursing note reviewed  Constitutional:       Appearance: Normal appearance  She is not ill-appearing or toxic-appearing  HENT:      Head: Normocephalic and atraumatic  Eyes:      Conjunctiva/sclera: Conjunctivae normal    Cardiovascular:      Rate and Rhythm: Normal rate and regular rhythm  Heart sounds: Normal heart sounds  Pulmonary:      Effort: Pulmonary effort is normal       Breath sounds: Normal breath sounds  No wheezing, rhonchi or rales  Musculoskeletal:      Cervical back: Neck supple  Comments: Forward head carriage to inspection otherwise no gross abnormality to inspection  There is no ttp to b/l UEs  There is dec ROM to her R GH joint near end range, FROM of the L  There is 3-4/5 strength to the R shoulder with provocative testing today versus the L which is 5/5  There is positive mora/empty and full can testing today   (+) Maikel Ortega as well for weakness but no pain    Lymphadenopathy:      Cervical: No cervical adenopathy  Skin:     General: Skin is warm and dry  Neurological:      General: No focal deficit present  Mental Status: She is alert and oriented to person, place, and time  Psychiatric:         Mood and Affect: Mood normal          Behavior: Behavior normal          Thought Content:  Thought content normal          Judgment: Judgment normal

## 2022-04-28 LAB — EXTERNAL HIV SCREEN: NORMAL

## 2022-05-03 ENCOUNTER — TELEPHONE (OUTPATIENT)
Dept: OTHER | Facility: OTHER | Age: 57
End: 2022-05-03

## 2022-05-03 NOTE — TELEPHONE ENCOUNTER
Called to cancel her appt for tomorrow this am  It was able to be rescheduled on 6/21 per her request  She just received a confirmation call regarding this and wanted to make sure it was indeed cancelled

## 2022-05-05 NOTE — TELEPHONE ENCOUNTER
Patient stated she canceled an appointment for 5/4/2022 and she seen a $10 00 co-pay was applied to her account through [employer] and would like to discuss

## 2022-06-21 ENCOUNTER — OFFICE VISIT (OUTPATIENT)
Dept: FAMILY MEDICINE CLINIC | Facility: CLINIC | Age: 57
End: 2022-06-21
Payer: COMMERCIAL

## 2022-06-21 VITALS
TEMPERATURE: 98.6 F | RESPIRATION RATE: 18 BRPM | BODY MASS INDEX: 24.55 KG/M2 | OXYGEN SATURATION: 97 % | HEART RATE: 88 BPM | SYSTOLIC BLOOD PRESSURE: 112 MMHG | DIASTOLIC BLOOD PRESSURE: 60 MMHG | WEIGHT: 143.8 LBS | HEIGHT: 64 IN

## 2022-06-21 DIAGNOSIS — Z01.818 PRE-OP EVALUATION: ICD-10-CM

## 2022-06-21 DIAGNOSIS — R53.83 FATIGUE DUE TO DEPRESSION: ICD-10-CM

## 2022-06-21 DIAGNOSIS — K21.9 GASTROESOPHAGEAL REFLUX DISEASE WITHOUT ESOPHAGITIS: ICD-10-CM

## 2022-06-21 DIAGNOSIS — Z11.4 SCREENING FOR HIV (HUMAN IMMUNODEFICIENCY VIRUS): ICD-10-CM

## 2022-06-21 DIAGNOSIS — G35 MULTIPLE SCLEROSIS (HCC): ICD-10-CM

## 2022-06-21 DIAGNOSIS — Z00.00 MEDICARE ANNUAL WELLNESS VISIT, SUBSEQUENT: Primary | ICD-10-CM

## 2022-06-21 DIAGNOSIS — F32.A FATIGUE DUE TO DEPRESSION: ICD-10-CM

## 2022-06-21 PROCEDURE — 3725F SCREEN DEPRESSION PERFORMED: CPT | Performed by: FAMILY MEDICINE

## 2022-06-21 PROCEDURE — 99214 OFFICE O/P EST MOD 30 MIN: CPT | Performed by: FAMILY MEDICINE

## 2022-06-21 PROCEDURE — G0438 PPPS, INITIAL VISIT: HCPCS | Performed by: FAMILY MEDICINE

## 2022-06-21 PROCEDURE — 1036F TOBACCO NON-USER: CPT | Performed by: FAMILY MEDICINE

## 2022-06-21 PROCEDURE — 3008F BODY MASS INDEX DOCD: CPT | Performed by: FAMILY MEDICINE

## 2022-06-21 RX ORDER — CLINDAMYCIN HYDROCHLORIDE 150 MG/1
150 CAPSULE ORAL 3 TIMES DAILY
COMMUNITY

## 2022-06-21 NOTE — PROGRESS NOTES
Assessment and Plan:     AMW visit -- brought up to date based on necessity and shared decision making  Preventive health issues were discussed with patient, and age appropriate screening tests were ordered as noted in patient's After Visit Summary  Personalized health advice and appropriate referrals for health education or preventive services given if needed, as noted in patient's After Visit Summary  Chief Complaint   Patient presents with    Pre-op Exam     Needs ppw filled out for right knee replacement 22    Medicare Wellness Visit        History of Present Illness:     Patient presents for a Medicare Wellness Visit      Patient Care Team:  Amadeo Bradford MD as PCP - General Connie Suarez PA-C as Neurologist (Physician Assistant)  Magdalene Gonzalez MD (Ophthalmology)     Review of Systems:     Review of Systems   All other systems reviewed and are negative  Problem List:     Patient Active Problem List   Diagnosis    Major depressive disorder    Fatigue due to depression    Multiple sclerosis (HCC)    Gastroesophageal reflux disease      Past Medical and Surgical History:     History reviewed  No pertinent past medical history    Past Surgical History:   Procedure Laterality Date     SECTION      KNEE ARTHROSCOPY      VAGINAL HYSTERECTOMY        Family History:     Family History   Problem Relation Age of Onset    Diabetes Mother     Hyperlipidemia Mother     Hypertension Mother     Osteoporosis Mother     Other Mother         spinal stenosis    Coronary artery disease Father       Social History:     Social History     Socioeconomic History    Marital status: /Civil Union     Spouse name: None    Number of children: None    Years of education: None    Highest education level: None   Occupational History    None   Tobacco Use    Smoking status: Former Smoker    Smokeless tobacco: Never Used   Substance and Sexual Activity    Alcohol use: Yes     Comment: occasionally    Drug use: Never    Sexual activity: None   Other Topics Concern    None   Social History Narrative    None     Social Determinants of Health     Financial Resource Strain: Not on file   Food Insecurity: Not on file   Transportation Needs: Not on file   Physical Activity: Not on file   Stress: Not on file   Social Connections: Not on file   Intimate Partner Violence: Not on file   Housing Stability: Not on file      Medications and Allergies:     Current Outpatient Medications   Medication Sig Dispense Refill    clindamycin (CLEOCIN) 150 mg capsule Take 150 mg by mouth Three times a day      Glatiramer Acetate 40 MG/ML SOSY Inject 40 mg under the skin 3 (three) times a week        ibuprofen (MOTRIN) 200 mg tablet Take 200 mg by mouth every 6 (six) hours as needed      mupirocin (BACTROBAN) 2 % ointment Apply 1 application topically 2 (two) times a day      omeprazole (PriLOSEC) 40 MG capsule Take 1 capsule (40 mg total) by mouth every morning 90 capsule 3     No current facility-administered medications for this visit  Allergies   Allergen Reactions    Augmentin [Amoxicillin-Pot Clavulanate] Vomiting      Immunizations:     Immunization History   Administered Date(s) Administered    COVID-19 MODERNA VACC 0 5 ML IM 01/23/2021, 02/27/2021, 11/03/2021, 05/11/2022    DTP 12/28/2009    INFLUENZA 01/13/2014, 10/18/2016, 11/02/2018, 11/13/2020    Influenza, injectable, quadrivalent, preservative free 0 5 mL 11/02/2018    Influenza, recombinant, quadrivalent,injectable, preservative free 02/02/2022    Tdap 03/22/2021      Health Maintenance:         Topic Date Due    HIV Screening  07/21/2022 (Originally 8/13/1980)    Cervical Cancer Screening  09/13/2022    Breast Cancer Screening: Mammogram  01/13/2023    Colorectal Cancer Screening  04/14/2025    Hepatitis C Screening  Completed     There are no preventive care reminders to display for this patient     Medicare Screening Tests and Risk Assessments:     Last Medicare Wellness visit information reviewed, patient interviewed and updates made to the record today  Health Risk Assessment:   Patient rates overall health as good  Patient feels that their physical health rating is same  Patient is satisfied with their life  Eyesight was rated as same  Hearing was rated as same  Patient feels that their emotional and mental health rating is same  Patients states they are sometimes angry  Patient states they are always unusually tired/fatigued  Pain experienced in the last 7 days has been some  Patient's pain rating has been 5/10  Patient states that she has experienced weight loss or gain in last 6 months  Patient with depression for some time -- fatigue   R knee pain she is having R knee replacement -- 7/14/22     Depression Screening:   PHQ-9 Score: 12      Fall Risk Screening: In the past year, patient has experienced: no history of falling in past year      Urinary Incontinence Screening:   Patient has leaked urine accidently in the last six months  Home Safety:  Patient does not have trouble with stairs inside or outside of their home  Patient has working smoke alarms and has working carbon monoxide detector  Home safety hazards include: none  Does have animals at home per pt    Nutrition:   Current diet is Regular  Medications:   Patient is currently taking over-the-counter supplements  OTC medications include: see medication list  Patient is able to manage medications  Activities of Daily Living (ADLs)/Instrumental Activities of Daily Living (IADLs):   Walk and transfer into and out of bed and chair?: Yes  Dress and groom yourself?: Yes    Bathe or shower yourself?: Yes    Feed yourself?  Yes  Do your laundry/housekeeping?: Yes  Manage your money, pay your bills and track your expenses?: Yes  Make your own meals?: Yes    Do your own shopping?: Yes    Previous Hospitalizations:   Any hospitalizations or ED visits within the last 12 months?: No      Advance Care Planning:   Living will: No    Durable POA for healthcare: No    Advanced directive: No    Advanced directive counseling given: Yes    Five wishes given: Yes      Comments: Lives at home with her  and her mother lives with her     Cognitive Screening:   Provider or family/friend/caregiver concerned regarding cognition?: No    PREVENTIVE SCREENINGS      Cardiovascular Screening:    General: Screening Current      Diabetes Screening:     General: Screening Current      Colorectal Cancer Screening:     General: Screening Current      Breast Cancer Screening:     General: Screening Current      Cervical Cancer Screening:    General: Screening Current      Osteoporosis Screening:    General: Screening Not Indicated      Abdominal Aortic Aneurysm (AAA) Screening:        General: Screening Not Indicated      Lung Cancer Screening:     General: Screening Not Indicated      Hepatitis C Screening:    General: Screening Current      Preventive Screening Comments: Former smoker -- about 10 years ago     Screening, Brief Intervention, and Referral to Treatment (SBIRT)    Screening  Typical number of drinks in a day: 0  Typical number of drinks in a week: 0  Interpretation: Low risk drinking behavior  Single Item Drug Screening:  How often have you used an illegal drug (including marijuana) or a prescription medication for non-medical reasons in the past year? never    Single Item Drug Screen Score: 0  Interpretation: Negative screen for possible drug use disorder    Other Counseling Topics:   Car/seat belt/driving safety, skin self-exam, sunscreen and calcium and vitamin D intake and regular weightbearing exercise  No exam data present     Physical Exam:     /60   Pulse 88   Temp 98 6 °F (37 °C)   Resp 18   Ht 5' 4" (1 626 m)   Wt 65 2 kg (143 lb 12 8 oz)   SpO2 97%   BMI 24 68 kg/m²     Physical Exam  Vitals and nursing note reviewed            Sherita Avina DO

## 2022-06-21 NOTE — PROGRESS NOTES
Subjective:     Juan Luis Gutner is a 60 y o  female who presents to the office today for a preoperative consultation at the request of surgeon Dr Esmer Daniels who plans on performing R total knee replacement on July 14  This consultation is requested for the specific conditions prompting preoperative evaluation (i e  because of potential affect on operative risk): n/a  Planned anesthesia: general  The patient has the following known anesthesia issues: n/a  Patients bleeding risk: no recent abnormal bleeding and no remote history of abnormal bleeding  Patient does not have objections to receiving blood products if needed  Patient has some depressive symptoms, these are not new  She declines meds; stays tired regardless if she is on/off meds  No cp, sob, swelling in her hands or her feet that is anything new, palpitations at times with anxiety, otherwise, none  No BARAKAT or other symptoms  Has R knee pain -- having a knee replacement  The following portions of the patient's history were reviewed and updated as appropriate: allergies, current medications, past family history, past medical history, past social history, past surgical history and problem list     Review of Systems  Pertinent items are noted in HPI       Objective:     /60   Pulse 88   Temp 98 6 °F (37 °C)   Resp 18   Ht 5' 4" (1 626 m)   Wt 65 2 kg (143 lb 12 8 oz)   SpO2 97%   BMI 24 68 kg/m²     General Appearance:    Alert, cooperative, no distress, appears stated age   Head:    Normocephalic, without obvious abnormality, atraumatic   Eyes:    PERRL, conjunctiva/corneas clear, EOM's intact, fundi     benign, both eyes   Ears:    Normal TM's and external ear canals, both ears   Nose:   Nares normal, septum midline, mucosa normal, no drainage    or sinus tenderness   Throat:   Lips, mucosa, and tongue normal; teeth and gums normal   Neck:   Supple, symmetrical, trachea midline, no adenopathy;     thyroid:  no enlargement/tenderness/nodules; no carotid    bruit or JVD   Back:     Symmetric, no curvature, ROM normal, no CVA tenderness   Lungs:     Clear to auscultation bilaterally, respirations unlabored   Chest Wall:    No tenderness or deformity    Heart:    Regular rate and rhythm, S1 and S2 normal, no murmur, rub   or gallop   Breast Exam:    No tenderness, masses, or nipple abnormality   Abdomen:     Soft, non-tender, bowel sounds active all four quadrants,     no masses, no organomegaly   Genitalia:    Normal female without lesion, discharge or tenderness   Rectal:    Normal tone, no masses or tenderness; guaiac negative stool   Extremities:   Extremities normal, atraumatic, no cyanosis or edema   Pulses:   2+ and symmetric all extremities   Skin:   Skin color, texture, turgor normal, no rashes or lesions   Lymph nodes:   Cervical, supraclavicular, and axillary nodes normal   Neurologic:   CNII-XII intact, normal strength, sensation and reflexes     throughout       Predictors of intubation difficulty:   Morbid obesity? no   Anatomically abnormal facies? no   Prominent incisors? no   Receding mandible? no   Short, thick neck? no   Mallampati score: I (soft palate, uvula, fauces, and tonsillar pillars visible)   Dentition: No chipped, loose, or missing teeth  Cardiographics  ECG:    Ref Range & Units 1 d ago   Impression  SINUS BRADYCARDIA   OTHERWISE NORMAL ECG   NO PREVIOUS ECGS AVAILABLE    Confirmed By  Confirmed by Alondra German MD, AMADEO ([id number]) on 6/20/2022 2:35:13 PM    Ventricular Rate EKG/MIN BPM 54    Atrial Rate BPM 54    [place]-Interval (MSEC) ms 150    QRS-Interval (MSEC) ms 82    QT-Interval (MSEC) ms 428    QTC ms 405    P Axis degrees 53    R Axis degrees -9    T Axis degrees 42        Echocardiogram: not done    Imaging  Chest x-ray: n/a     Lab Review   No visits with results within 2 Month(s) from this visit     Latest known visit with results is:   Orders Only on 01/13/2020   Component Date Value    Hemoglobin A1C 01/13/2020 5 3       Patient had her pre-op labs through [facility] - these were reviewed by me  Potassium was mildly elev, but normal in April  Patient has h/o Gilbert's and there Bili is stable  Assessment/Plan:       60 y o  female with planned surgery as above  She has been risk stratified today and based on today's examination, is medically stable for surgery on 7/14/22  Slip to be faxed to surgeon's office with office note  Labs/EKG reviewed in the system  Known risk factors for perioperative complications: None    Difficulty with intubation is not anticipated  Cardiac Risk Estimation: 0 Points - Class I Risk - 3 9 %  30-day risk of death, MI, or cardiac arrest    From Ellen 2017, based on pooled data from 5 high quality external validations (4 prospective)  These numbers are higher than those often quoted from the now-outdated original study (Estela [id number])  See Evidence for details  Current medications which may produce withdrawal symptoms if withheld perioperatively: n/a     Plan:     1  Preoperative workup as follows done per surgical team   2  Change in medication regimen before surgery: per surgical team; is not on any high risk meds  3  Prophylaxis for cardiac events with perioperative beta-blockers: should be considered, specific regimen per anesthesia  4  Invasive hemodynamic monitoring perioperatively: not indicated  5  Deep vein thrombosis prophylaxis postoperatively:regimen to be chosen by surgical team   Mother with h/o clotting disorder on lifelong anticoag as is her brother but pt was tested and negative   6   Surveillance for postoperative MI with ECG immediately postoperatively and on postoperative days 1 and 2 AND troponin levels 24 hours postoperatively and on day 4 or hospital discharge (whichever comes first): at the discretion of anesthesiologist   7  Other measures: pending how things go      Fatigue due to depression  - stable, declines further w/u/treatment  Gastroesophageal reflux disease without esophagitis  - stable on PPI  MS - stable, is on board with Neurology and is on treatment, 3 times/week

## 2022-06-21 NOTE — PATIENT INSTRUCTIONS
Medicare Preventive Visit Patient Instructions  Thank you for completing your Welcome to Medicare Visit or Medicare Annual Wellness Visit today  Your next wellness visit will be due in one year (6/22/2023)  The screening/preventive services that you may require over the next 5-10 years are detailed below  Some tests may not apply to you based off risk factors and/or age  Screening tests ordered at today's visit but not completed yet may show as past due  Also, please note that scanned in results may not display below  Preventive Screenings:  Service Recommendations Previous Testing/Comments   Colorectal Cancer Screening  * Colonoscopy    * Fecal Occult Blood Test (FOBT)/Fecal Immunochemical Test (FIT)  * Fecal DNA/Cologuard Test  * Flexible Sigmoidoscopy Age: 54-65 years old   Colonoscopy: every 10 years (may be performed more frequently if at higher risk)  OR  FOBT/FIT: every 1 year  OR  Cologuard: every 3 years  OR  Sigmoidoscopy: every 5 years  Screening may be recommended earlier than age 48 if at higher risk for colorectal cancer  Also, an individualized decision between you and your healthcare provider will decide whether screening between the ages of 74-80 would be appropriate  Colonoscopy: 04/14/2015  FOBT/FIT: Not on file  Cologuard: Not on file  Sigmoidoscopy: Not on file    Screening Current     Breast Cancer Screening Age: 36 years old  Frequency: every 1-2 years  Not required if history of left and right mastectomy Mammogram: 01/13/2022    Screening Current   Cervical Cancer Screening Between the ages of 21-29, pap smear recommended once every 3 years  Between the ages of 33-67, can perform pap smear with HPV co-testing every 5 years     Recommendations may differ for women with a history of total hysterectomy, cervical cancer, or abnormal pap smears in past  Pap Smear: 09/13/2019    Screening Current   Hepatitis C Screening Once for adults born between 1945 and 1965  More frequently in patients at high risk for Hepatitis C Hep C Antibody: 12/13/2018    Screening Current   Diabetes Screening 1-2 times per year if you're at risk for diabetes or have pre-diabetes Fasting glucose: No results in last 5 years   A1C: 5 3        Cholesterol Screening Once every 5 years if you don't have a lipid disorder  May order more often based on risk factors  Lipid panel: Not on file          Other Preventive Screenings Covered by Medicare:  1  Abdominal Aortic Aneurysm (AAA) Screening: covered once if your at risk  You're considered to be at risk if you have a family history of AAA  2  Lung Cancer Screening: covers low dose CT scan once per year if you meet all of the following conditions: (1) Age 50-69; (2) No signs or symptoms of lung cancer; (3) Current smoker or have quit smoking within the last 15 years; (4) You have a tobacco smoking history of at least 30 pack years (packs per day multiplied by number of years you smoked); (5) You get a written order from a healthcare provider  3  Glaucoma Screening: covered annually if you're considered high risk: (1) You have diabetes OR (2) Family history of glaucoma OR (3)  aged 48 and older OR (3)  American aged 72 and older  3  Osteoporosis Screening: covered every 2 years if you meet one of the following conditions: (1) You're estrogen deficient and at risk for osteoporosis based off medical history and other findings; (2) Have a vertebral abnormality; (3) On glucocorticoid therapy for more than 3 months; (4) Have primary hyperparathyroidism; (5) On osteoporosis medications and need to assess response to drug therapy  · Last bone density test (DXA Scan): Not on file  5  HIV Screening: covered annually if you're between the age of 12-76  Also covered annually if you are younger than 13 and older than 72 with risk factors for HIV infection  For pregnant patients, it is covered up to 3 times per pregnancy      Immunizations:  Immunization Recommendations   Influenza Vaccine Annual influenza vaccination during flu season is recommended for all persons aged >= 6 months who do not have contraindications   Pneumococcal Vaccine (Prevnar and Pneumovax)  * Prevnar = PCV13  * Pneumovax = PPSV23   Adults 25-60 years old: 1-3 doses may be recommended based on certain risk factors  Adults 72 years old: Prevnar (PCV13) vaccine recommended followed by Pneumovax (PPSV23) vaccine  If already received PPSV23 since turning 65, then PCV13 recommended at least one year after PPSV23 dose  Hepatitis B Vaccine 3 dose series if at intermediate or high risk (ex: diabetes, end stage renal disease, liver disease)   Tetanus (Td) Vaccine - COST NOT COVERED BY MEDICARE PART B Following completion of primary series, a booster dose should be given every 10 years to maintain immunity against tetanus  Td may also be given as tetanus wound prophylaxis  Tdap Vaccine - COST NOT COVERED BY MEDICARE PART B Recommended at least once for all adults  For pregnant patients, recommended with each pregnancy  Shingles Vaccine (Shingrix) - COST NOT COVERED BY MEDICARE PART B  2 shot series recommended in those aged 48 and above     Health Maintenance Due:      Topic Date Due    HIV Screening  Never done    Cervical Cancer Screening  09/13/2022    Breast Cancer Screening: Mammogram  01/13/2023    Colorectal Cancer Screening  04/14/2025    Hepatitis C Screening  Completed     Immunizations Due:      Topic Date Due    COVID-19 Vaccine (4 - Booster for Almon Ishihara series) 03/03/2022     Advance Directives   What are advance directives? Advance directives are legal documents that state your wishes and plans for medical care  These plans are made ahead of time in case you lose your ability to make decisions for yourself  Advance directives can apply to any medical decision, such as the treatments you want, and if you want to donate organs  What are the types of advance directives?   There are many types of advance directives, and each state has rules about how to use them  You may choose a combination of any of the following:  · Living will: This is a written record of the treatment you want  You can also choose which treatments you do not want, which to limit, and which to stop at a certain time  This includes surgery, medicine, IV fluid, and tube feedings  · Durable power of  for healthcare [facility]): This is a written record that states who you want to make healthcare choices for you when you are unable to make them for yourself  This person, called a proxy, is usually a family member or a friend  You may choose more than 1 proxy  · Do not resuscitate (DNR) order:  A DNR order is used in case your heart stops beating or you stop breathing  It is a request not to have certain forms of treatment, such as CPR  A DNR order may be included in other types of advance directives  · Medical directive: This covers the care that you want if you are in a coma, near death, or unable to make decisions for yourself  You can list the treatments you want for each condition  Treatment may include pain medicine, surgery, blood transfusions, dialysis, IV or tube feedings, and a ventilator (breathing machine)  · Values history: This document has questions about your views, beliefs, and how you feel and think about life  This information can help others choose the care that you would choose  Why are advance directives important? An advance directive helps you control your care  Although spoken wishes may be used, it is better to have your wishes written down  Spoken wishes can be misunderstood, or not followed  Treatments may be given even if you do not want them  An advance directive may make it easier for your family to make difficult choices about your care  Urinary Incontinence   Urinary incontinence (UI)  is when you lose control of your bladder   UI develops because your bladder cannot store or empty urine properly  The 3 most common types of UI are stress incontinence, urge incontinence, or both  Medicines:   · May be given to help strengthen your bladder control  Report any side effects of medication to your healthcare provider  Do pelvic muscle exercises often:  Your pelvic muscles help you stop urinating  Squeeze these muscles tight for 5 seconds, then relax for 5 seconds  Gradually work up to squeezing for 10 seconds  Do 3 sets of 15 repetitions a day, or as directed  This will help strengthen your pelvic muscles and improve bladder control  Train your bladder:  Go to the bathroom at set times, such as every 2 hours, even if you do not feel the urge to go  You can also try to hold your urine when you feel the urge to go  For example, hold your urine for 5 minutes when you feel the urge to go  As that becomes easier, hold your urine for 10 minutes  Self-care:   · Keep a UI record  Write down how often you leak urine and how much you leak  Make a note of what you were doing when you leaked urine  · Drink liquids as directed  You may need to limit the amount of liquid you drink to help control your urine leakage  Do not drink any liquid right before you go to bed  Limit or do not have drinks that contain caffeine or alcohol  · Prevent constipation  Eat a variety of high-fiber foods  Good examples are high-fiber cereals, beans, vegetables, and whole-grain breads  Walking is the best way to trigger your intestines to have a bowel movement  · Exercise regularly and maintain a healthy weight  Weight loss and exercise will decrease pressure on your bladder and help you control your leakage  · Use a catheter as directed  to help empty your bladder  A catheter is a tiny, plastic tube that is put into your bladder to drain your urine  · Go to behavior therapy as directed  Behavior therapy may be used to help you learn to control your urge to urinate         © Copyright [employer] 2018 Information is for End User's use only and may not be sold, redistributed or otherwise used for commercial purposes   All illustrations and images included in CareNotes® are the copyrighted property of A D A M , Inc  or [facility] Sheri Wetzle

## 2022-11-03 ENCOUNTER — OFFICE VISIT (OUTPATIENT)
Dept: FAMILY MEDICINE CLINIC | Facility: CLINIC | Age: 57
End: 2022-11-03

## 2022-11-03 VITALS
OXYGEN SATURATION: 100 % | DIASTOLIC BLOOD PRESSURE: 73 MMHG | TEMPERATURE: 98.7 F | SYSTOLIC BLOOD PRESSURE: 115 MMHG | BODY MASS INDEX: 28.27 KG/M2 | WEIGHT: 144 LBS | HEIGHT: 60 IN | RESPIRATION RATE: 18 BRPM | HEART RATE: 73 BPM

## 2022-11-03 DIAGNOSIS — Z12.31 ENCOUNTER FOR SCREENING MAMMOGRAM FOR BREAST CANCER: ICD-10-CM

## 2022-11-03 DIAGNOSIS — Z11.4 SCREENING FOR HIV (HUMAN IMMUNODEFICIENCY VIRUS): ICD-10-CM

## 2022-11-03 DIAGNOSIS — Z12.4 SCREENING FOR CERVICAL CANCER: ICD-10-CM

## 2022-11-03 DIAGNOSIS — F33.9 RECURRENT DEPRESSION (HCC): ICD-10-CM

## 2022-11-03 DIAGNOSIS — Z23 NEEDS FLU SHOT: Primary | ICD-10-CM

## 2022-11-03 RX ORDER — PEN NEEDLE, DIABETIC 31 GX5/16"
NEEDLE, DISPOSABLE MISCELLANEOUS
COMMUNITY
Start: 2022-10-07

## 2022-11-03 RX ORDER — CITALOPRAM 20 MG/1
20 TABLET ORAL DAILY
Qty: 30 TABLET | Refills: 1 | Status: SHIPPED | OUTPATIENT
Start: 2022-11-03

## 2022-11-03 NOTE — PROGRESS NOTES
Name: Frank Ortega      : 1965      MRN: [id number]  Encounter Provider: Yousuf Ralph DO  Encounter Date: 11/3/2022   [facility]    Assessment & Plan     BMI Counseling: Body mass index is 28 12 kg/m²  The BMI is above normal  Nutrition recommendations include decreasing portion sizes, encouraging healthy choices of fruits and vegetables, decreasing fast food intake, consuming healthier snacks, limiting drinks that contain sugar, reducing intake of saturated and trans fat and reducing intake of cholesterol  Exercise recommendations include exercising 3-5 times per week and strength training exercises  Rationale for BMI follow-up plan is due to patient being overweight or obese  Chief Complaint   Patient presents with   • Physical Exam     Just had a physical in   Subjective      GERD -- stable  No n/v/d       MS -- on board with Neurology  S/p Knee replacement in July - this is going well for her  Happy she did this  PTSD/Major Depression  -- not doing well  She has been on/after Celexa for many years  Is currently off and not doing well  She is not with SI  She is helping to care for her mother  She lives at home with her and her   Would like to get back on this  Review of Systems   All other systems reviewed and are negative        Current Outpatient Medications on File Prior to Visit   Medication Sig   • Alcohol Swabs (Alcohol Prep) PADS use as directed   • Glatiramer Acetate 40 MG/ML SOSY Inject 40 mg under the skin 3 (three) times a week     • ibuprofen (MOTRIN) 200 mg tablet Take 200 mg by mouth every 6 (six) hours as needed   • omeprazole (PriLOSEC) 40 MG capsule Take 1 capsule (40 mg total) by mouth every morning   • [DISCONTINUED] clindamycin (CLEOCIN) 150 mg capsule Take 150 mg by mouth Three times a day (Patient not taking: Reported on 11/3/2022)   • [DISCONTINUED] mupirocin (BACTROBAN) 2 % ointment Apply 1 application topically 2 (two) times a day (Patient not taking: Reported on 11/3/2022)       Objective     /73 (BP Location: Left arm, Patient Position: Sitting, Cuff Size: Large)   Pulse 73   Temp 98 7 °F (37 1 °C) (Temporal)   Resp 18   Ht 5' (1 524 m)   Wt 65 3 kg (144 lb)   SpO2 100%   BMI 28 12 kg/m²     Physical Exam  Vitals and nursing note reviewed  Constitutional:       Appearance: Normal appearance  HENT:      Head: Normocephalic and atraumatic  Eyes:      Conjunctiva/sclera: Conjunctivae normal       Pupils: Pupils are equal, round, and reactive to light  Cardiovascular:      Rate and Rhythm: Normal rate and regular rhythm  Heart sounds: Normal heart sounds  Pulmonary:      Effort: Pulmonary effort is normal       Breath sounds: Normal breath sounds  No wheezing, rhonchi or rales  Musculoskeletal:      Cervical back: Neck supple  Lymphadenopathy:      Cervical: No cervical adenopathy  Neurological:      General: No focal deficit present  Mental Status: She is alert and oriented to person, place, and time     Psychiatric:      Comments: Flat affect, congruent with mood         Su Wesley, DO

## 2022-11-04 ENCOUNTER — TELEPHONE (OUTPATIENT)
Dept: ADMINISTRATIVE | Facility: OTHER | Age: 57
End: 2022-11-04

## 2022-11-04 NOTE — LETTER
Procedure Request Form: Cervical Cancer Screening - Please send report for 22 - thank you      Date Requested: 22  Patient: Vy Franks  Patient : 1965   Referring Provider: Noemy Speaker, MD        Date of Procedure ______________________________       The above patient has informed us that they have completed their   most recent Cervical Cancer Screening at your facility  Please complete   this form and attach all corresponding procedure reports/results  Comments __________________________________________________________  ____________________________________________________________________  ____________________________________________________________________  ____________________________________________________________________    Facility Completing Procedure _________________________________________    Form Completed By (print name) _______________________________________      Signature __________________________________________________________      These reports are needed for  compliance  Please fax this completed form and a copy of the procedure report to our office located at [place] [address] as soon as possible to [phone] isac Freed: Phone [phone]    We thank you for your assistance in treating our mutual patient

## 2022-11-04 NOTE — TELEPHONE ENCOUNTER
As a follow-up, a second attempt has been made for outreach via fax to facility  , please see Contacts section for details      Thank you  Valentine Crawford

## 2022-11-04 NOTE — LETTER
Procedure Request Form: Cervical Cancer Screening  Pap 22    Date Requested: 22  Patient: Saud Reap  Patient : 1965   Referring Provider: Carlie Figueroa MD        Date of Procedure ______________________________       The above patient has informed us that they have completed their   most recent Cervical Cancer Screening at your facility  Please complete   this form and attach all corresponding procedure reports/results  Comments __________________________________________________________  ____________________________________________________________________  ____________________________________________________________________  ____________________________________________________________________    Facility Completing Procedure _________________________________________    Form Completed By (print name) _______________________________________      Signature __________________________________________________________      These reports are needed for  compliance  Please fax this completed form and a copy of the procedure report to our office located at [place] [address] as soon as possible to [phone] [address]: Phone [phone]    We thank you for your assistance in treating our mutual patient

## 2022-11-04 NOTE — TELEPHONE ENCOUNTER
----- Message from Tom Rondon sent at 11/3/2022 11:55 AM EDT -----  11/03/22 11:55 AM    Hello, our patient No patient name on file  has had Pap Smear (HPV) aka Cervical Cancer Screening completed/performed  Please assist in updating the patient chart by pulling the Care Everywhere (CE) document  The date of service is 11/1/22       Thank you,  Tom DUMONT

## 2022-11-04 NOTE — TELEPHONE ENCOUNTER
Upon review of the In Basket request and the patient's chart, initial outreach has been made via fax to facility  , please see Contacts section for details       Thank you  Casey Young

## 2022-11-08 NOTE — TELEPHONE ENCOUNTER
As a final attempt, a third outreach has been made via telephone call to facility  Please see Contacts section for details  This encounter will be closed and completed by end of day  Should we receive the requested information because of previous outreach attempts, the requested patient's chart will be updated appropriately  LMOM - looking for 11/1/2022 pap?     Thank you  Valentine Crawford

## 2022-12-12 DIAGNOSIS — K21.9 GASTROESOPHAGEAL REFLUX DISEASE: ICD-10-CM

## 2022-12-12 DIAGNOSIS — F33.9 RECURRENT DEPRESSION (HCC): ICD-10-CM

## 2022-12-12 RX ORDER — OMEPRAZOLE 40 MG/1
40 CAPSULE, DELAYED RELEASE ORAL EVERY MORNING
Qty: 90 CAPSULE | Refills: 3 | Status: SHIPPED | OUTPATIENT
Start: 2022-12-12

## 2022-12-12 RX ORDER — CITALOPRAM 20 MG/1
20 TABLET ORAL DAILY
Qty: 30 TABLET | Refills: 1 | Status: SHIPPED | OUTPATIENT
Start: 2022-12-12

## 2022-12-21 ENCOUNTER — TELEPHONE (OUTPATIENT)
Dept: FAMILY MEDICINE CLINIC | Facility: CLINIC | Age: 57
End: 2022-12-21

## 2022-12-21 NOTE — TELEPHONE ENCOUNTER
Pt c/o of red/itchy outside of armpits  Using hydrocortizone cream and its not working  Shes asking for RX be sent in for her

## 2022-12-22 ENCOUNTER — OFFICE VISIT (OUTPATIENT)
Dept: FAMILY MEDICINE CLINIC | Facility: CLINIC | Age: 57
End: 2022-12-22

## 2022-12-22 VITALS
WEIGHT: 148 LBS | TEMPERATURE: 98.8 F | OXYGEN SATURATION: 98 % | HEART RATE: 72 BPM | HEIGHT: 60 IN | DIASTOLIC BLOOD PRESSURE: 74 MMHG | RESPIRATION RATE: 18 BRPM | BODY MASS INDEX: 29.06 KG/M2 | SYSTOLIC BLOOD PRESSURE: 118 MMHG

## 2022-12-22 DIAGNOSIS — L24.3 IRRITANT CONTACT DERMATITIS DUE TO COSMETICS: Primary | ICD-10-CM

## 2022-12-22 RX ORDER — CLOBETASOL PROPIONATE 0.5 MG/G
CREAM TOPICAL 2 TIMES DAILY
Qty: 45 G | Refills: 1 | Status: SHIPPED | OUTPATIENT
Start: 2022-12-22 | End: 2023-01-05

## 2022-12-22 RX ORDER — CETIRIZINE HYDROCHLORIDE 10 MG/1
10 TABLET ORAL DAILY
Qty: 30 TABLET | Refills: 1 | Status: SHIPPED | OUTPATIENT
Start: 2022-12-22

## 2022-12-22 NOTE — PROGRESS NOTES
Name: Majo Sidhu      : 1965      MRN: [id number]  Encounter Provider: Sara Nelson DO  Encounter Date: 2022   Encounter department: [address] [place] [address]  Irritant contact dermatitis due to cosmetics  clobetasol (TEMOVATE) 0 05 % cream    cetirizine (ZyrTEC) 10 mg tablet        This is most c/w irritant derm 2/2 her deodorant  Started shortly after using new brand of deodorant  We will tx with clobetasol cream bid X 2 weeks with Cerave on top for hydration  STOP deodorant in the meantime  When she transitions back, recommend hypoallergenic deodorant - Yesenia Contreras is an option  She is to take daily zyrtec as well for 1 month  She is on board  Will let me know if there is worsening or persistence, despite  Patient/Caretaker verbalized understanding and were in agreement with today's assessment and plan  Time was taken to address any questions patient/caretaker had  Indication/Risks/Benefits of medication(s) as prescribed were discussed with the patient/caretaker  The patient verbalized understanding and agreement and elects to take medications as prescribed  Time was taken to answer any questions the patient/caretaker may have had  Chief Complaint   Patient presents with   • itch armpit     Both arms are itchy but left arm are red and itchy       Subjective      Patient with month + h/o rash in armpit area  She bought new deodorant and it started after this  She cont to use  It is itchy at times, other times, it is burning  No sprad  Just under the pit area  She has been using hydrocortisone otc and not improving  She is very frustrated by this  Review of Systems   All other systems reviewed and are negative        Current Outpatient Medications on File Prior to Visit   Medication Sig   • Alcohol Swabs (Alcohol Prep) PADS use as directed   • citalopram (CeleXA) 20 mg tablet TAKE 1 TABLET (20 MG TOTAL) BY MOUTH DAILY • Glatiramer Acetate 40 MG/ML SOSY Inject 40 mg under the skin 3 (three) times a week     • ibuprofen (MOTRIN) 200 mg tablet Take 200 mg by mouth every 6 (six) hours as needed   • omeprazole (PriLOSEC) 40 MG capsule TAKE 1 CAPSULE (40 MG TOTAL) BY MOUTH EVERY MORNING       Objective     /74 (BP Location: Left arm, Patient Position: Sitting, Cuff Size: Large)   Pulse 72   Temp 98 8 °F (37 1 °C) (Temporal)   Resp 18   Ht 5' (1 524 m)   Wt 67 1 kg (148 lb)   SpO2 98%   BMI 28 90 kg/m²     Physical Exam  Vitals and nursing note reviewed  Constitutional:       Appearance: Normal appearance  HENT:      Head: Normocephalic and atraumatic  Eyes:      Conjunctiva/sclera: Conjunctivae normal       Pupils: Pupils are equal, round, and reactive to light  Cardiovascular:      Rate and Rhythm: Normal rate and regular rhythm  Pulmonary:      Effort: Pulmonary effort is normal       Breath sounds: Normal breath sounds  No wheezing, rhonchi or rales  Musculoskeletal:         General: No deformity  Cervical back: Neck supple  Lymphadenopathy:      Cervical: No cervical adenopathy  Skin:     General: Skin is warm and dry  Comments: Annular, macular-papular rash in the axillary area that is red, no erythema  There are 2 patches in similar spots in the axillar - near post axillary line    Neurological:      General: No focal deficit present  Mental Status: She is alert and oriented to person, place, and time  Psychiatric:         Mood and Affect: Mood normal          Behavior: Behavior normal          Thought Content:  Thought content normal          Judgment: Judgment normal        Su Jones DO

## 2023-01-03 ENCOUNTER — OFFICE VISIT (OUTPATIENT)
Dept: FAMILY MEDICINE CLINIC | Facility: CLINIC | Age: 58
End: 2023-01-03

## 2023-01-03 VITALS
WEIGHT: 148.6 LBS | TEMPERATURE: 98.5 F | HEART RATE: 82 BPM | RESPIRATION RATE: 18 BRPM | OXYGEN SATURATION: 95 % | SYSTOLIC BLOOD PRESSURE: 118 MMHG | DIASTOLIC BLOOD PRESSURE: 78 MMHG | BODY MASS INDEX: 29.17 KG/M2 | HEIGHT: 60 IN

## 2023-01-03 DIAGNOSIS — G35 MULTIPLE SCLEROSIS (HCC): ICD-10-CM

## 2023-01-03 DIAGNOSIS — F33.9 RECURRENT DEPRESSION (HCC): Primary | ICD-10-CM

## 2023-01-03 RX ORDER — CITALOPRAM 40 MG/1
40 TABLET ORAL DAILY
Qty: 90 TABLET | Refills: 0 | Status: SHIPPED | OUTPATIENT
Start: 2023-01-03

## 2023-01-03 NOTE — PROGRESS NOTES
Name: Negrita Bowling      : 1965      MRN: [id number]  Encounter Provider: Lissette Allen DO  Encounter Date: 1/3/2023   Encounter department: [place]    Assessment & Plan     [facility]  - will bump to 40 mg/day of Celexa  She is ? ADHD  If not doing well with this in a few weeks, we can trial Strattera prior to her f/u with me in 6w  She is on board with this plan  No si/hi  MS - stable, follows with Neurology  Return in about 6 weeks (around 2023) for f/u Depression/Medication Management   BMI Counseling: There is no height or weight on file to calculate BMI  The BMI is above normal  Nutrition recommendations include decreasing portion sizes, encouraging healthy choices of fruits and vegetables, decreasing fast food intake, consuming healthier snacks, limiting drinks that contain sugar, reducing intake of saturated and trans fat and reducing intake of cholesterol  Exercise recommendations include exercising 3-5 times per week and strength training exercises  Rationale for BMI follow-up plan is due to patient being overweight or obese  Patient/Caretaker verbalized understanding and were in agreement with today's assessment and plan  Time was taken to address any questions patient/caretaker had  Indication/Risks/Benefits of medication(s) as prescribed were discussed with the patient/caretaker  The patient verbalized understanding and agreement and elects to take medications as prescribed  Time was taken to answer any questions the patient/caretaker may have had  Chief Complaint   Patient presents with   • Follow-up     8 week f/u      Just had a physical in   Subjective      MS -- on board with Neurology  S/p Knee replacement in July - this is going well for her  Happy she did this  PTSD/Major Depression  -- celexa restarted in Nov due to recurrent symptoms   She was on this in the past and it was helpful  She does not feel this has been overly helpful  She is not with SI  She is helping to care for her mother - she is in the [address] and this point - was having to take care of her at home  She lives at home with her   She typically does not do well with winter months  She is ok to increase to 40 mg/day  She is meeting with a psychologist in [address] [place] [address] and finds this helpful  She was also told she may have some ADHD and might do well with Strattera -- she does not want to do this as we dial up her Celexa  She tells me she is "self diagnosed ADHD "  She also feels she has some dyslexia  Pt does not work - she is s/p MS  Seen in Dec and noted to have axillary dermatitis  Steroid topical was started  And this has cleared this up  No issues to report in this regard  Review of Systems   All other systems reviewed and are negative  Current Outpatient Medications on File Prior to Visit   Medication Sig   • Alcohol Swabs (Alcohol Prep) PADS use as directed   • cetirizine (ZyrTEC) 10 mg tablet Take 1 tablet (10 mg total) by mouth daily   • clobetasol (TEMOVATE) 0 05 % cream Apply topically 2 (two) times a day for 14 days   • Glatiramer Acetate 40 MG/ML SOSY Inject 40 mg under the skin 3 (three) times a week     • ibuprofen (MOTRIN) 200 mg tablet Take 200 mg by mouth every 6 (six) hours as needed   • omeprazole (PriLOSEC) 40 MG capsule TAKE 1 CAPSULE (40 MG TOTAL) BY MOUTH EVERY MORNING   • [DISCONTINUED] citalopram (CeleXA) 20 mg tablet TAKE 1 TABLET (20 MG TOTAL) BY MOUTH DAILY       Objective     /78 (BP Location: Right arm, Patient Position: Sitting, Cuff Size: Standard)   Pulse 82   Temp 98 5 °F (36 9 °C) (Temporal)   Resp 18   Ht 5' (1 524 m)   Wt 67 4 kg (148 lb 9 6 oz)   SpO2 95%   BMI 29 02 kg/m²     Physical Exam  Vitals and nursing note reviewed  Constitutional:       Appearance: Normal appearance     HENT:      Head: Normocephalic and atraumatic  Eyes:      Conjunctiva/sclera: Conjunctivae normal       Pupils: Pupils are equal, round, and reactive to light  Cardiovascular:      Rate and Rhythm: Normal rate and regular rhythm  Heart sounds: Normal heart sounds  Pulmonary:      Effort: Pulmonary effort is normal       Breath sounds: Normal breath sounds  No wheezing, rhonchi or rales  Musculoskeletal:      Cervical back: Neck supple  Lymphadenopathy:      Cervical: No cervical adenopathy  Neurological:      General: No focal deficit present  Mental Status: She is alert and oriented to person, place, and time     Psychiatric:      Comments: Flat affect, congruent with mood         Su Barakat, DO

## 2023-03-03 ENCOUNTER — OFFICE VISIT (OUTPATIENT)
Dept: FAMILY MEDICINE CLINIC | Facility: CLINIC | Age: 58
End: 2023-03-03

## 2023-03-03 VITALS
TEMPERATURE: 98.2 F | DIASTOLIC BLOOD PRESSURE: 75 MMHG | OXYGEN SATURATION: 98 % | RESPIRATION RATE: 18 BRPM | BODY MASS INDEX: 29.33 KG/M2 | WEIGHT: 150.2 LBS | SYSTOLIC BLOOD PRESSURE: 128 MMHG | HEART RATE: 74 BPM

## 2023-03-03 DIAGNOSIS — M54.32 BILATERAL SCIATICA: ICD-10-CM

## 2023-03-03 DIAGNOSIS — M54.31 BILATERAL SCIATICA: ICD-10-CM

## 2023-03-03 DIAGNOSIS — F33.9 RECURRENT DEPRESSION (HCC): Primary | ICD-10-CM

## 2023-03-03 DIAGNOSIS — G35 MULTIPLE SCLEROSIS (HCC): ICD-10-CM

## 2023-03-03 DIAGNOSIS — M85.80 OSTEOPENIA, UNSPECIFIED LOCATION: ICD-10-CM

## 2023-03-03 RX ORDER — CITALOPRAM 40 MG/1
40 TABLET ORAL DAILY
Qty: 90 TABLET | Refills: 1 | Status: SHIPPED | OUTPATIENT
Start: 2023-03-03

## 2023-03-03 NOTE — PATIENT INSTRUCTIONS
Bone Health:  I recommend calcium 1200 mg daily in divided doses and vitamin D 2000 units daily for improved bone quality  Our recommendations are Calcium Citrate for better absorption

## 2023-03-03 NOTE — PROGRESS NOTES
Name: Tonya Hernandez      : 1965      MRN: [id number]  Encounter Provider: Brooke Holly DO  Encounter Date: 3/3/2023   Encounter department: [address] [place] [address]     1  Recurrent depression ([facility] )  - will continue regimen  She typically does much better with warmer, sunnier weather  Refill sent to pharmacy for her     - citalopram (CeleXA) 40 mg tablet; Take 1 tablet (40 mg total) by mouth daily  Dispense: 90 tablet; Refill: 1    2  Osteopenia, unspecified location  DEXA done  Bone Health:  I recommend calcium 1200 mg daily in divided doses and vitamin D 2000 units daily for improved bone quality  Our recommendations are Calcium Citrate for better absorption  3  Multiple sclerosis ([facility] )  - she has f/u with neurology on Monday  4  Bilateral sciatica  She is with worsening symptoms, L>R  She is doing NSAIDs prn and ice  She declines further w/u for this at the moment and will also d/w Neurologist on Monday  Return for [facility] visit in the summer when due - has to be after    Patient/Caretaker verbalized understanding and were in agreement with today's assessment and plan  Time was taken to address any questions patient/caretaker had  Indication/Risks/Benefits of medication(s) as prescribed were discussed with the patient/caretaker  The patient verbalized understanding and agreement and elects to take medications as prescribed  Time was taken to answer any questions the patient/caretaker may have had  BMI Counseling: There is no height or weight on file to calculate BMI  The BMI is above normal  Nutrition recommendations include decreasing portion sizes, encouraging healthy choices of fruits and vegetables, decreasing fast food intake, consuming healthier snacks, limiting drinks that contain sugar, reducing intake of saturated and trans fat and reducing intake of cholesterol   Exercise recommendations include exercising 3-5 times per week and strength training exercises  Rationale for BMI follow-up plan is due to patient being overweight or obese  Chief Complaint   Patient presents with   • Depression     Recheck for antidepressants       Subjective      MS -- on board with Neurology  She has an appt on Monday  S/p Knee replacement in July - this is going well for her  Happy she did this  She is c/o sciatica and it is flared right now  Her L leg is giving out on her at the moment  She takes ibuprofen prn and puts ice on it as well  She has not fallen  She will d/w Neurologist at her appt on Monday  PTSD/Major Depression  -- feels stable today  She does better with warmer, sunnier weather  She is also painting, almost daily, and this helps her  She is helping to care for her mother - she is in the [address] and [place] point - was having to take care of her at home  She lives at home with her   She is on board with a psychologist in [facility] and finds this helpful  S    Pt does not work - she is with MS  Review of Systems   All other systems reviewed and are negative        Current Outpatient Medications on File Prior to Visit   Medication Sig   • Alcohol Swabs (Alcohol Prep) PADS use as directed   • Glatiramer Acetate 40 MG/ML SOSY Inject 40 mg under the skin 3 (three) times a week     • ibuprofen (MOTRIN) 200 mg tablet Take 200 mg by mouth every 6 (six) hours as needed   • omeprazole (PriLOSEC) 40 MG capsule TAKE 1 CAPSULE (40 MG TOTAL) BY MOUTH EVERY MORNING   • [DISCONTINUED] citalopram (CeleXA) 40 mg tablet Take 1 tablet (40 mg total) by mouth daily   • cetirizine (ZyrTEC) 10 mg tablet Take 1 tablet (10 mg total) by mouth daily (Patient not taking: Reported on 3/3/2023)   • clobetasol (TEMOVATE) 0 05 % cream Apply topically 2 (two) times a day for 14 days       Objective     /75 (BP Location: Left arm, Patient Position: Sitting, Cuff Size: Standard)   Pulse 74   Temp 98 2 °F (36 8 °C) (Tympanic)   Resp 18   Wt 68 1 kg (150 lb 3 2 oz)   SpO2 98%   BMI 29 33 kg/m²     Physical Exam  Vitals and nursing note reviewed  Constitutional:       Appearance: Normal appearance  HENT:      Head: Normocephalic and atraumatic  Eyes:      Conjunctiva/sclera: Conjunctivae normal       Pupils: Pupils are equal, round, and reactive to light  Cardiovascular:      Rate and Rhythm: Normal rate and regular rhythm  Heart sounds: Normal heart sounds  Pulmonary:      Effort: Pulmonary effort is normal       Breath sounds: Normal breath sounds  No wheezing, rhonchi or rales  Musculoskeletal:      Cervical back: Neck supple  Comments: ttp in the lumbar spine and L buttock  Guarded, antalgic gait today  Strength maintained  Lymphadenopathy:      Cervical: No cervical adenopathy  Skin:     General: Skin is warm and dry  Neurological:      General: No focal deficit present  Mental Status: She is alert and oriented to person, place, and time  Psychiatric:         Mood and Affect: Mood normal          Behavior: Behavior normal          Thought Content:  Thought content normal          Judgment: Judgment normal        Amy Mortimer Adu, DO

## 2023-06-15 DIAGNOSIS — F33.9 RECURRENT DEPRESSION (HCC): ICD-10-CM

## 2023-06-16 RX ORDER — CITALOPRAM 40 MG/1
40 TABLET ORAL DAILY
Qty: 90 TABLET | Refills: 1 | Status: SHIPPED | OUTPATIENT
Start: 2023-06-16

## 2023-07-06 ENCOUNTER — TELEPHONE (OUTPATIENT)
Dept: FAMILY MEDICINE CLINIC | Facility: CLINIC | Age: 58
End: 2023-07-06

## 2023-07-06 DIAGNOSIS — L03.119 CELLULITIS OF UPPER EXTREMITY, UNSPECIFIED LATERALITY: ICD-10-CM

## 2023-07-06 RX ORDER — CEPHALEXIN 500 MG/1
500 CAPSULE ORAL EVERY 6 HOURS SCHEDULED
Qty: 20 CAPSULE | Refills: 0 | Status: SHIPPED | OUTPATIENT
Start: 2023-07-06 | End: 2023-07-08

## 2023-07-25 ENCOUNTER — TELEPHONE (OUTPATIENT)
Dept: ADMINISTRATIVE | Facility: OTHER | Age: 58
End: 2023-07-25

## 2023-07-25 NOTE — TELEPHONE ENCOUNTER
----- Message from Radha Beck sent at 7/24/2023  1:37 PM EDT -----  Regarding: Care Gap Request  07/24/23 1:37 PM    Hello, our patient attached above has had HIV completed/performed. Please assist in updating the patient chart by pulling the Care Everywhere (CE) document. The date of service is 4/28/2022.      Thank you,  Radha PORTILLO [facility]

## 2023-07-25 NOTE — TELEPHONE ENCOUNTER
Upon review of the In Basket request we were able to locate, review, and update the patient chart as requested for HIV. Any additional questions or concerns should be emailed to the Practice Liaisons via the appropriate education email address, please do not reply via In Basket.     Thank you  Aung London

## 2023-07-25 NOTE — TELEPHONE ENCOUNTER
----- Message from Estella Naylor sent at 7/24/2023  1:38 PM EDT -----  Regarding: Care Gap Request  07/24/23 1:38 PM    Hello, our patient attached above has had Mammogram completed/performed. Please assist in updating the patient chart by pulling the Care Everywhere (CE) document. The date of service is 1/19/2023.      Thank you,  Estella PORTILLO [facility]

## 2023-07-25 NOTE — TELEPHONE ENCOUNTER
Upon review of the In Basket request we were able to locate, review, and update the patient chart as requested for Mammogram.    Any additional questions or concerns should be emailed to the Practice Liaisons via the appropriate education email address, please do not reply via In Basket.     Thank you  Mallory Chappell MA

## 2023-08-30 ENCOUNTER — OFFICE VISIT (OUTPATIENT)
Dept: FAMILY MEDICINE CLINIC | Facility: CLINIC | Age: 58
End: 2023-08-30
Payer: MEDICARE

## 2023-08-30 VITALS
TEMPERATURE: 98.5 F | RESPIRATION RATE: 18 BRPM | BODY MASS INDEX: 29.29 KG/M2 | WEIGHT: 150 LBS | DIASTOLIC BLOOD PRESSURE: 74 MMHG | OXYGEN SATURATION: 97 % | SYSTOLIC BLOOD PRESSURE: 116 MMHG | HEART RATE: 74 BPM

## 2023-08-30 DIAGNOSIS — G35 MULTIPLE SCLEROSIS (HCC): ICD-10-CM

## 2023-08-30 DIAGNOSIS — R17 ELEVATED BILIRUBIN: ICD-10-CM

## 2023-08-30 DIAGNOSIS — E78.00 ELEVATED CHOLESTEROL: ICD-10-CM

## 2023-08-30 DIAGNOSIS — F33.9 RECURRENT DEPRESSION (HCC): ICD-10-CM

## 2023-08-30 DIAGNOSIS — Z87.891 FORMER SMOKER: ICD-10-CM

## 2023-08-30 DIAGNOSIS — Z00.00 MEDICARE ANNUAL WELLNESS VISIT, SUBSEQUENT: Primary | ICD-10-CM

## 2023-08-30 DIAGNOSIS — F10.10 ALCOHOL ABUSE: ICD-10-CM

## 2023-08-30 DIAGNOSIS — Z71.89 ADVANCE CARE PLANNING: ICD-10-CM

## 2023-08-30 DIAGNOSIS — Z82.49 FAMILY HISTORY OF CORONARY ARTERIOSCLEROSIS: ICD-10-CM

## 2023-08-30 DIAGNOSIS — Z13.220 SCREENING, LIPID: ICD-10-CM

## 2023-08-30 PROCEDURE — G0438 PPPS, INITIAL VISIT: HCPCS | Performed by: FAMILY MEDICINE

## 2023-08-30 PROCEDURE — 99214 OFFICE O/P EST MOD 30 MIN: CPT | Performed by: FAMILY MEDICINE

## 2023-08-30 RX ORDER — CITALOPRAM 40 MG/1
40 TABLET ORAL DAILY
Qty: 90 TABLET | Refills: 3 | Status: SHIPPED | OUTPATIENT
Start: 2023-08-30
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
## 2023-08-30 NOTE — PATIENT INSTRUCTIONS
Medicare Preventive Visit Patient Instructions  Thank you for completing your Welcome to Medicare Visit or Medicare Annual Wellness Visit today. Your next wellness visit will be due in one year (8/30/2024). The screening/preventive services that you may require over the next 5-10 years are detailed below. Some tests may not apply to you based off risk factors and/or age. Screening tests ordered at today's visit but not completed yet may show as past due. Also, please note that scanned in results may not display below. Preventive Screenings:  Service Recommendations Previous Testing/Comments   Colorectal Cancer Screening  * Colonoscopy    * Fecal Occult Blood Test (FOBT)/Fecal Immunochemical Test (FIT)  * Fecal DNA/Cologuard Test  * Flexible Sigmoidoscopy Age: 43-73 years old   Colonoscopy: every 10 years (may be performed more frequently if at higher risk)  OR  FOBT/FIT: every 1 year  OR  Cologuard: every 3 years  OR  Sigmoidoscopy: every 5 years  Screening may be recommended earlier than age 39 if at higher risk for colorectal cancer. Also, an individualized decision between you and your healthcare provider will decide whether screening between the ages of 77-80 would be appropriate. Colonoscopy: 04/14/2015  FOBT/FIT: Not on file  Cologuard: Not on file  Sigmoidoscopy: Not on file    Screening Current     Breast Cancer Screening Age: 36 years old  Frequency: every 1-2 years  Not required if history of left and right mastectomy Mammogram: 01/19/2023    Screening Current   Cervical Cancer Screening Between the ages of 21-29, pap smear recommended once every 3 years. Between the ages of 32-69, can perform pap smear with HPV co-testing every 5 years.    Recommendations may differ for women with a history of total hysterectomy, cervical cancer, or abnormal pap smears in past. Pap Smear: 11/01/2022    Screening Current   Hepatitis C Screening Once for adults born between 1945 and 1965  More frequently in patients at high risk for Hepatitis C Hep C Antibody: 12/13/2018    Screening Current   Diabetes Screening 1-2 times per year if you're at risk for diabetes or have pre-diabetes Fasting glucose: No results in last 5 years (No results in last 5 years)  A1C: 5.3 (1/13/2020)      Cholesterol Screening Once every 5 years if you don't have a lipid disorder. May order more often based on risk factors. Lipid panel: Not on file          Other Preventive Screenings Covered by Medicare:  1. Abdominal Aortic Aneurysm (AAA) Screening: covered once if your at risk. You're considered to be at risk if you have a family history of AAA. 2. Lung Cancer Screening: covers low dose CT scan once per year if you meet all of the following conditions: (1) Age 48-67; (2) No signs or symptoms of lung cancer; (3) Current smoker or have quit smoking within the last 15 years; (4) You have a tobacco smoking history of at least 20 pack years (packs per day multiplied by number of years you smoked); (5) You get a written order from a healthcare provider. 3. Glaucoma Screening: covered annually if you're considered high risk: (1) You have diabetes OR (2) Family history of glaucoma OR (3)  aged 48 and older OR (3)  American aged 72 and older  3. Osteoporosis Screening: covered every 2 years if you meet one of the following conditions: (1) You're estrogen deficient and at risk for osteoporosis based off medical history and other findings; (2) Have a vertebral abnormality; (3) On glucocorticoid therapy for more than 3 months; (4) Have primary hyperparathyroidism; (5) On osteoporosis medications and need to assess response to drug therapy. · Last bone density test (DXA Scan): Not on file. 5. HIV Screening: covered annually if you're between the age of 14-79. Also covered annually if you are younger than 13 and older than 72 with risk factors for HIV infection.  For pregnant patients, it is covered up to 3 times per pregnancy. Immunizations:  Immunization Recommendations   Influenza Vaccine Annual influenza vaccination during flu season is recommended for all persons aged >= 6 months who do not have contraindications   Pneumococcal Vaccine   * Pneumococcal conjugate vaccine = PCV13 (Prevnar 13), PCV15 (Vaxneuvance), PCV20 (Prevnar 20)  * Pneumococcal polysaccharide vaccine = PPSV23 (Pneumovax) Adults 20-63 years old: 1-3 doses may be recommended based on certain risk factors  Adults 72 years old: 1-2 doses may be recommended based off what pneumonia vaccine you previously received   Hepatitis B Vaccine 3 dose series if at intermediate or high risk (ex: diabetes, end stage renal disease, liver disease)   Tetanus (Td) Vaccine - COST NOT COVERED BY MEDICARE PART B Following completion of primary series, a booster dose should be given every 10 years to maintain immunity against tetanus. Td may also be given as tetanus wound prophylaxis. Tdap Vaccine - COST NOT COVERED BY MEDICARE PART B Recommended at least once for all adults. For pregnant patients, recommended with each pregnancy. Shingles Vaccine (Shingrix) - COST NOT COVERED BY MEDICARE PART B  2 shot series recommended in those aged 48 and above     Health Maintenance Due:      Topic Date Due   • Breast Cancer Screening: Mammogram  01/19/2024   • Colorectal Cancer Screening  04/14/2025   • Cervical Cancer Screening  11/01/2025   • HIV Screening  Completed   • Hepatitis C Screening  Completed     Immunizations Due:      Topic Date Due   • COVID-19 Vaccine (5 - Moderna series) 07/06/2022   • Influenza Vaccine (1) 09/01/2023     Advance Directives   What are advance directives? Advance directives are legal documents that state your wishes and plans for medical care. These plans are made ahead of time in case you lose your ability to make decisions for yourself.  Advance directives can apply to any medical decision, such as the treatments you want, and if you want to donate organs. What are the types of advance directives? There are many types of advance directives, and each state has rules about how to use them. You may choose a combination of any of the following:  · Living will: This is a written record of the treatment you want. You can also choose which treatments you do not want, which to limit, and which to stop at a certain time. This includes surgery, medicine, IV fluid, and tube feedings. · Durable power of  for healthcare [facility]): This is a written record that states who you want to make healthcare choices for you when you are unable to make them for yourself. This person, called a proxy, is usually a family member or a friend. You may choose more than 1 proxy. · Do not resuscitate (DNR) order:  A DNR order is used in case your heart stops beating or you stop breathing. It is a request not to have certain forms of treatment, such as CPR. A DNR order may be included in other types of advance directives. · Medical directive: This covers the care that you want if you are in a coma, near death, or unable to make decisions for yourself. You can list the treatments you want for each condition. Treatment may include pain medicine, surgery, blood transfusions, dialysis, IV or tube feedings, and a ventilator (breathing machine). · Values history: This document has questions about your views, beliefs, and how you feel and think about life. This information can help others choose the care that you would choose. Why are advance directives important? An advance directive helps you control your care. Although spoken wishes may be used, it is better to have your wishes written down. Spoken wishes can be misunderstood, or not followed. Treatments may be given even if you do not want them. An advance directive may make it easier for your family to make difficult choices about your care.    Urinary Incontinence   Urinary incontinence (UI)  is when you lose control of your bladder. UI develops because your bladder cannot store or empty urine properly. The 3 most common types of UI are stress incontinence, urge incontinence, or both. Medicines:   · May be given to help strengthen your bladder control. Report any side effects of medication to your healthcare provider. Do pelvic muscle exercises often:  Your pelvic muscles help you stop urinating. Squeeze these muscles tight for 5 seconds, then relax for 5 seconds. Gradually work up to squeezing for 10 seconds. Do 3 sets of 15 repetitions a day, or as directed. This will help strengthen your pelvic muscles and improve bladder control. Train your bladder:  Go to the bathroom at set times, such as every 2 hours, even if you do not feel the urge to go. You can also try to hold your urine when you feel the urge to go. For example, hold your urine for 5 minutes when you feel the urge to go. As that becomes easier, hold your urine for 10 minutes. Self-care:   · Keep a UI record. Write down how often you leak urine and how much you leak. Make a note of what you were doing when you leaked urine. · Drink liquids as directed. You may need to limit the amount of liquid you drink to help control your urine leakage. Do not drink any liquid right before you go to bed. Limit or do not have drinks that contain caffeine or alcohol. · Prevent constipation. Eat a variety of high-fiber foods. Good examples are high-fiber cereals, beans, vegetables, and whole-grain breads. Walking is the best way to trigger your intestines to have a bowel movement. · Exercise regularly and maintain a healthy weight. Weight loss and exercise will decrease pressure on your bladder and help you control your leakage. · Use a catheter as directed  to help empty your bladder. A catheter is a tiny, plastic tube that is put into your bladder to drain your urine. · Go to behavior therapy as directed.   Behavior therapy may be used to help you learn to control your urge to urinate. Weight Management   Why it is important to manage your weight:  Being overweight increases your risk of health conditions such as heart disease, high blood pressure, type 2 diabetes, and certain types of cancer. It can also increase your risk for osteoarthritis, sleep apnea, and other respiratory problems. Aim for a slow, steady weight loss. Even a small amount of weight loss can lower your risk of health problems. How to lose weight safely:  A safe and healthy way to lose weight is to eat fewer calories and get regular exercise. You can lose up about 1 pound a week by decreasing the number of calories you eat by 500 calories each day. Healthy meal plan for weight management:  A healthy meal plan includes a variety of foods, contains fewer calories, and helps you stay healthy. A healthy meal plan includes the following:  · Eat whole-grain foods more often. A healthy meal plan should contain fiber. Fiber is the part of grains, fruits, and vegetables that is not broken down by your body. Whole-grain foods are healthy and provide extra fiber in your diet. Some examples of whole-grain foods are whole-wheat breads and pastas, oatmeal, brown rice, and bulgur. · Eat a variety of vegetables every day. Include dark, leafy greens such as spinach, kale, rose greens, and mustard greens. Eat yellow and orange vegetables such as carrots, sweet potatoes, and winter squash. · Eat a variety of fruits every day. Choose fresh or canned fruit (canned in its own juice or light syrup) instead of juice. Fruit juice has very little or no fiber. · Eat low-fat dairy foods. Drink fat-free (skim) milk or 1% milk. Eat fat-free yogurt and low-fat cottage cheese. Try low-fat cheeses such as mozzarella and other reduced-fat cheeses. · Choose meat and other protein foods that are low in fat. Choose beans or other legumes such as split peas or lentils.  Choose fish, skinless poultry (chicken or turkey), or lean cuts of red meat (beef or pork). Before you cook meat or poultry, cut off any visible fat. · Use less fat and oil. Try baking foods instead of frying them. Add less fat, such as margarine, sour cream, regular salad dressing and mayonnaise to foods. Eat fewer high-fat foods. Some examples of high-fat foods include french fries, doughnuts, ice cream, and cakes. · Eat fewer sweets. Limit foods and drinks that are high in sugar. This includes candy, cookies, regular soda, and sweetened drinks. Exercise:  Exercise at least 30 minutes per day on most days of the week. Some examples of exercise include walking, biking, dancing, and swimming. You can also fit in more physical activity by taking the stairs instead of the elevator or parking farther away from stores. Ask your healthcare provider about the best exercise plan for you. © Copyright [employer] 2018 Information is for End User's use only and may not be sold, redistributed or otherwise used for commercial purposes.  All illustrations and images included in CareNotes® are the copyrighted property of [employer] or [address] [place] [address]

## 2023-11-07 ENCOUNTER — TELEPHONE (OUTPATIENT)
Dept: FAMILY MEDICINE CLINIC | Facility: CLINIC | Age: 58
End: 2023-11-07

## 2023-11-07 DIAGNOSIS — R73.01 IFG (IMPAIRED FASTING GLUCOSE): Primary | ICD-10-CM

## 2023-11-07 NOTE — TELEPHONE ENCOUNTER
Patient called and asked if you would submit a lab order for a HgbA1C. She said she has been testing at home and it has been a little high the last few times.

## 2023-11-27 ENCOUNTER — TELEPHONE (OUTPATIENT)
Dept: FAMILY MEDICINE CLINIC | Facility: CLINIC | Age: 58
End: 2023-11-27

## 2023-11-27 DIAGNOSIS — R39.9 UTI SYMPTOMS: Primary | ICD-10-CM

## 2023-11-27 RX ORDER — SULFAMETHOXAZOLE AND TRIMETHOPRIM 800; 160 MG/1; MG/1
1 TABLET ORAL EVERY 12 HOURS SCHEDULED
Qty: 14 TABLET | Refills: 0 | Status: SHIPPED | OUTPATIENT
Start: 2023-11-27 | End: 2023-12-04

## 2023-12-05 DIAGNOSIS — K21.9 GASTROESOPHAGEAL REFLUX DISEASE: ICD-10-CM

## 2023-12-05 RX ORDER — OMEPRAZOLE 40 MG/1
40 CAPSULE, DELAYED RELEASE ORAL EVERY MORNING
Qty: 90 CAPSULE | Refills: 3 | Status: SHIPPED | OUTPATIENT
Start: 2023-12-05

## 2024-02-12 ENCOUNTER — TELEPHONE (OUTPATIENT)
Age: 59
End: 2024-02-12

## 2024-02-16 ENCOUNTER — OFFICE VISIT (OUTPATIENT)
Dept: FAMILY MEDICINE CLINIC | Facility: CLINIC | Age: 59
End: 2024-02-16
Payer: COMMERCIAL

## 2024-02-16 VITALS
TEMPERATURE: 97.6 F | OXYGEN SATURATION: 94 % | DIASTOLIC BLOOD PRESSURE: 80 MMHG | WEIGHT: 156.2 LBS | BODY MASS INDEX: 30.67 KG/M2 | SYSTOLIC BLOOD PRESSURE: 110 MMHG | HEIGHT: 60 IN | HEART RATE: 82 BPM

## 2024-02-16 DIAGNOSIS — L03.221 CELLULITIS OF NECK: Primary | ICD-10-CM

## 2024-02-16 PROCEDURE — 99213 OFFICE O/P EST LOW 20 MIN: CPT

## 2024-02-16 RX ORDER — CLINDAMYCIN HYDROCHLORIDE 300 MG/1
300 CAPSULE ORAL 4 TIMES DAILY
Qty: 28 CAPSULE | Refills: 0 | Status: SHIPPED | OUTPATIENT
Start: 2024-02-16 | End: 2024-02-23

## 2024-02-16 RX ORDER — ELECTROLYTES/DEXTROSE
SOLUTION, ORAL ORAL
COMMUNITY

## 2024-02-16 RX ORDER — MULTIVIT-MIN/IRON/FOLIC ACID/K 18-600-40
CAPSULE ORAL
COMMUNITY

## 2024-02-16 NOTE — PROGRESS NOTES
Name: Jessica Ash      : 1965      MRN: [id number]  Encounter Provider: Jorge Zhang PA-C  Encounter Date: 2024   Encounter department: [facility]    Assessment & Plan     1. Cellulitis of neck  Assessment & Plan:  Patient is prescribed clindamycin 300 mg 1 capsule by mouth 4 times a day for 7 days.  Patient is educated on side effects of medication, especially C. difficile colitis.  Patient is educated to take a probiotic daily for this reason.  She is to get this over-the-counter when she picks up the antibiotic.  Patient is educated on signs and symptoms of worsening infection such as fever, dizziness, syncope, worsening erythema, worsening swelling, spread increasing, etc. patient is to go to emergency room if this occurs.    Orders:  -     clindamycin (CLEOCIN) 300 MG capsule; Take 1 capsule (300 mg total) by mouth 4 (four) times a day for 7 days           Subjective      Patient is a 58-year-old female that presents with a rash for 5 days.  Patient states that this resembles a cellulitis she had many years ago.    Rash  This is a new problem. The current episode started in the past 7 days (5 days.). The problem has been gradually improving since onset. The affected locations include the right shoulder and neck. The problem is mild. The rash is characterized by itchiness, pain, redness and swelling. She was exposed to nothing. The rash first occurred at another residence. Pertinent negatives include no anorexia, congestion, cough, decreased physical activity, decreased responsiveness, decreased sleep, drinking less, diarrhea, facial edema, fatigue, fever, itching, joint pain, rhinorrhea, shortness of breath, sore throat or vomiting. Treatments tried: Clindamycin and amoxicillin. The treatment provided moderate (Clindamycin did not help, amoxicillin has.) relief. There is no history of allergies, asthma, eczema or varicella. There were no sick contacts.     Review of  Systems   Constitutional:  Negative for appetite change, chills, decreased responsiveness, diaphoresis, fatigue and fever.   HENT:  Negative for congestion, ear discharge, ear pain, postnasal drip, rhinorrhea, sinus pressure, sinus pain, sneezing and sore throat.    Eyes:  Negative for pain, discharge, redness, itching and visual disturbance.   Respiratory:  Negative for apnea, cough, chest tightness, shortness of breath and wheezing.    Cardiovascular:  Negative for chest pain, palpitations and leg swelling.   Gastrointestinal:  Negative for abdominal pain, anorexia, blood in stool, constipation, diarrhea, nausea and vomiting.   Endocrine: Negative for cold intolerance, heat intolerance, polydipsia and polyuria.   Genitourinary:  Negative for dysuria, flank pain, frequency, hematuria and urgency.   Musculoskeletal:  Negative for arthralgias, back pain, joint pain, myalgias, neck pain and neck stiffness.   Skin:  Positive for color change and rash. Negative for itching.   Allergic/Immunologic: Negative for environmental allergies and food allergies.   Neurological:  Negative for dizziness, tremors, seizures, syncope, speech difficulty, weakness, light-headedness, numbness and headaches.   Hematological:  Negative for adenopathy. Does not bruise/bleed easily.   Psychiatric/Behavioral:  Negative for agitation, confusion, decreased concentration, dysphoric mood, hallucinations, self-injury, sleep disturbance and suicidal ideas. The patient is not nervous/anxious and is not hyperactive.    All other systems reviewed and are negative.      Current Outpatient Medications on File Prior to Visit   Medication Sig    Alcohol Swabs (Alcohol Prep) PADS use as directed    Ascorbic Acid (Vitamin C) 500 MG CAPS Take by mouth    Cholecalciferol (Vitamin D) 50 MCG (2000 UT) CAPS Take by mouth    citalopram (CeleXA) 40 mg tablet Take 1 tablet (40 mg total) by mouth daily    ibuprofen (MOTRIN) 200 mg tablet Take 200 mg by mouth every  6 (six) hours as needed    Multiple Vitamin (Multivitamin Adult) TABS Take by mouth    omeprazole (PriLOSEC) 40 MG capsule TAKE 1 CAPSULE (40 MG TOTAL) BY MOUTH EVERY MORNING    clobetasol (TEMOVATE) 0.05 % cream Apply topically 2 (two) times a day for 14 days (Patient not taking: Reported on 2/16/2024)    Glatiramer Acetate 40 MG/ML SOSY Inject 40 mg under the skin 3 (three) times a week   (Patient not taking: Reported on 2/16/2024)       Objective     /80 (BP Location: Right arm, Patient Position: Sitting)   Pulse 82   Temp 97.6 °F (36.4 °C) (Tympanic)   Ht 5' (1.524 m)   Wt 70.9 kg (156 lb 3.2 oz)   SpO2 94%   BMI 30.51 kg/m²     Physical Exam  Vitals and nursing note reviewed.   Constitutional:       Appearance: Normal appearance. She is obese. She is not ill-appearing or toxic-appearing.   HENT:      Head: Normocephalic and atraumatic.      Right Ear: Tympanic membrane normal.      Left Ear: Tympanic membrane normal.      Nose: Nose normal.      Mouth/Throat:      Mouth: Mucous membranes are moist.      Pharynx: Oropharynx is clear.   Eyes:      Extraocular Movements: Extraocular movements intact.      Conjunctiva/sclera: Conjunctivae normal.      Pupils: Pupils are equal, round, and reactive to light.   Neck:     Cardiovascular:      Rate and Rhythm: Normal rate and regular rhythm.      Pulses: Normal pulses.      Heart sounds: Normal heart sounds. No murmur heard.  Pulmonary:      Effort: Pulmonary effort is normal. No respiratory distress.      Breath sounds: Normal breath sounds. No wheezing.   Chest:      Chest wall: No tenderness.   Abdominal:      General: Bowel sounds are normal.      Palpations: Abdomen is soft. There is no mass.      Tenderness: There is no abdominal tenderness.   Musculoskeletal:         General: No tenderness. Normal range of motion.      Cervical back: Normal range of motion and neck supple. No tenderness.      Right lower leg: No edema.      Left lower leg: No edema.    Lymphadenopathy:      Cervical: No cervical adenopathy.   Skin:     General: Skin is warm.      Capillary Refill: Capillary refill takes less than 2 seconds.      Coloration: Skin is not jaundiced or pale.      Findings: Erythema, lesion and rash (Mildly erythematous not well demarcated macular rash with very mild swelling and warmness to touch.) present. No bruising.   Neurological:      General: No focal deficit present.      Mental Status: She is alert and oriented to person, place, and time. Mental status is at baseline.      Motor: No weakness.      Gait: Gait normal.   Psychiatric:         Mood and Affect: Mood normal.         Behavior: Behavior normal.         Thought Content: Thought content normal.         Judgment: Judgment normal.       Jorge Zhang PA-C

## 2024-02-16 NOTE — ASSESSMENT & PLAN NOTE
Patient is prescribed clindamycin 300 mg 1 capsule by mouth 4 times a day for 7 days.  Patient is educated on side effects of medication, especially C. difficile colitis.  Patient is educated to take a probiotic daily for this reason.  She is to get this over-the-counter when she picks up the antibiotic.  Patient is educated on signs and symptoms of worsening infection such as fever, dizziness, syncope, worsening erythema, worsening swelling, spread increasing, etc. patient is to go to emergency room if this occurs.

## 2024-03-01 ENCOUNTER — RA CDI HCC (OUTPATIENT)
Dept: OTHER | Facility: HOSPITAL | Age: 59
End: 2024-03-01

## 2024-03-04 LAB
ALBUMIN SERPL-MCNC: 4.3 G/DL (ref 3.5–5.7)
ALP SERPL-CCNC: 62 U/L (ref 35–120)
ALT SERPL-CCNC: 29 U/L
ANION GAP SERPL CALCULATED.3IONS-SCNC: 10 MMOL/L (ref 3–11)
AST SERPL-CCNC: 23 U/L
BASOPHILS # BLD AUTO: 0.1 THOU/CMM (ref 0–0.1)
BASOPHILS NFR BLD AUTO: 1 %
BILIRUB SERPL-MCNC: 1.3 MG/DL (ref 0.2–1)
BUN SERPL-MCNC: 11 MG/DL (ref 7–25)
CALCIUM SERPL-MCNC: 9.7 MG/DL (ref 8.5–10.1)
CHLORIDE SERPL-SCNC: 102 MMOL/L (ref 100–109)
CHOLEST SERPL-MCNC: 239 MG/DL
CHOLEST/HDLC SERPL: 3.9 {RATIO}
CO2 SERPL-SCNC: 29 MMOL/L (ref 21–31)
CREAT SERPL-MCNC: 0.83 MG/DL (ref 0.4–1.1)
CYTOLOGY CMNT CVX/VAG CYTO-IMP: ABNORMAL
DIFFERENTIAL METHOD BLD: ABNORMAL
EOSINOPHIL # BLD AUTO: 0.7 THOU/CMM (ref 0–0.5)
EOSINOPHIL NFR BLD AUTO: 9 %
ERYTHROCYTE [DISTWIDTH] IN BLOOD BY AUTOMATED COUNT: 13.6 % (ref 12–16)
GFR/BSA.PRED SERPLBLD CYS-BASED-ARV: 80 ML/MIN/{1.73_M2}
GLUCOSE SERPL-MCNC: 101 MG/DL (ref 65–99)
HCT VFR BLD AUTO: 41.4 % (ref 35–43)
HDLC SERPL-MCNC: 62 MG/DL (ref 23–92)
HGB BLD-MCNC: 14.2 G/DL (ref 11.5–14.5)
LDLC SERPL CALC-MCNC: 140 MG/DL
LYMPHOCYTES # BLD AUTO: 2.1 THOU/CMM (ref 1–3)
LYMPHOCYTES NFR BLD AUTO: 29 %
MCH RBC QN AUTO: 30.9 PG (ref 26–34)
MCHC RBC AUTO-ENTMCNC: 34.4 G/DL (ref 32–37)
MCV RBC AUTO: 90 FL (ref 80–100)
MONOCYTES # BLD AUTO: 0.4 THOU/CMM (ref 0.3–1)
MONOCYTES NFR BLD AUTO: 5 %
NEUTROPHILS # BLD AUTO: 4 THOU/CMM (ref 1.8–7.8)
NEUTROPHILS NFR BLD AUTO: 56 %
NONHDLC SERPL-MCNC: 177 MG/DL
PLATELET # BLD AUTO: 336 THOU/CMM (ref 140–350)
PMV BLD REES-ECKER: 8.1 FL (ref 7.5–11.3)
POTASSIUM SERPL-SCNC: 4.6 MMOL/L (ref 3.5–5.2)
PROT SERPL-MCNC: 7.1 G/DL (ref 6.3–8.3)
RBC # BLD AUTO: 4.61 MILL/CMM (ref 3.7–4.7)
SODIUM SERPL-SCNC: 141 MMOL/L (ref 135–145)
TRIGL SERPL-MCNC: 183 MG/DL
WBC # BLD AUTO: 7.2 THOU/CMM (ref 4–10)

## 2024-03-05 ENCOUNTER — TELEPHONE (OUTPATIENT)
Dept: FAMILY MEDICINE CLINIC | Facility: CLINIC | Age: 59
End: 2024-03-05

## 2024-03-05 LAB
EST. AVERAGE GLUCOSE BLD GHB EST-MCNC: 114 MG/DL
HBA1C MFR BLD: 5.6 %

## 2024-03-05 NOTE — TELEPHONE ENCOUNTER
Patient returned call in regards to labs, warm transferred to [facility] in triage for assistance .

## 2024-03-08 ENCOUNTER — OFFICE VISIT (OUTPATIENT)
Dept: FAMILY MEDICINE CLINIC | Facility: CLINIC | Age: 59
End: 2024-03-08
Payer: COMMERCIAL

## 2024-03-08 VITALS
WEIGHT: 158 LBS | BODY MASS INDEX: 30.86 KG/M2 | SYSTOLIC BLOOD PRESSURE: 118 MMHG | DIASTOLIC BLOOD PRESSURE: 70 MMHG | OXYGEN SATURATION: 98 % | HEART RATE: 75 BPM | TEMPERATURE: 97.7 F

## 2024-03-08 DIAGNOSIS — F32.A FATIGUE DUE TO DEPRESSION: ICD-10-CM

## 2024-03-08 DIAGNOSIS — F33.9 RECURRENT DEPRESSION (HCC): ICD-10-CM

## 2024-03-08 DIAGNOSIS — G35 MULTIPLE SCLEROSIS (HCC): ICD-10-CM

## 2024-03-08 DIAGNOSIS — F90.9 ATTENTION DEFICIT HYPERACTIVITY DISORDER (ADHD), UNSPECIFIED ADHD TYPE: ICD-10-CM

## 2024-03-08 DIAGNOSIS — K21.9 GASTROESOPHAGEAL REFLUX DISEASE: ICD-10-CM

## 2024-03-08 DIAGNOSIS — R53.83 FATIGUE DUE TO DEPRESSION: ICD-10-CM

## 2024-03-08 DIAGNOSIS — F33.1 MODERATE EPISODE OF RECURRENT MAJOR DEPRESSIVE DISORDER (HCC): Primary | ICD-10-CM

## 2024-03-08 DIAGNOSIS — K59.04 CHRONIC IDIOPATHIC CONSTIPATION: ICD-10-CM

## 2024-03-08 PROCEDURE — 99214 OFFICE O/P EST MOD 30 MIN: CPT | Performed by: FAMILY MEDICINE

## 2024-03-08 PROCEDURE — G2211 COMPLEX E/M VISIT ADD ON: HCPCS | Performed by: FAMILY MEDICINE

## 2024-03-08 RX ORDER — POLYETHYLENE GLYCOL 3350 17 G/17G
17 POWDER, FOR SOLUTION ORAL DAILY
Qty: 765 G | Refills: 5 | Status: SHIPPED | OUTPATIENT
Start: 2024-03-08

## 2024-03-08 RX ORDER — LISDEXAMFETAMINE DIMESYLATE CAPSULES 30 MG/1
30 CAPSULE ORAL EVERY MORNING
Qty: 30 CAPSULE | Refills: 0 | Status: SHIPPED | OUTPATIENT
Start: 2024-03-08

## 2024-03-08 RX ORDER — CITALOPRAM 40 MG/1
40 TABLET ORAL DAILY
Qty: 90 TABLET | Refills: 3 | Status: SHIPPED | OUTPATIENT
Start: 2024-03-08

## 2024-03-08 RX ORDER — OMEPRAZOLE 40 MG/1
40 CAPSULE, DELAYED RELEASE ORAL EVERY MORNING
Qty: 90 CAPSULE | Refills: 3 | Status: SHIPPED | OUTPATIENT
Start: 2024-03-08

## 2024-03-11 NOTE — PROGRESS NOTES
Assessment/Plan:    No problem-specific Assessment & Plan notes found for this encounter.       Diagnoses and all orders for this visit:    Moderate episode of recurrent major depressive disorder (HCC)    Gastroesophageal reflux disease  -     omeprazole (PriLOSEC) 40 MG capsule; Take 1 capsule (40 mg total) by mouth every morning    Recurrent depression (HCC)  -     citalopram (CeleXA) 40 mg tablet; Take 1 tablet (40 mg total) by mouth daily    Multiple sclerosis (HCC)  -     TSH, 3rd generation with Free T4 reflex; Future    Attention deficit hyperactivity disorder (ADHD), unspecified ADHD type  -     lisdexamfetamine (Vyvanse) 30 MG capsule; Take 1 capsule (30 mg total) by mouth every morning Max Daily Amount: 30 mg  -     TSH, 3rd generation with Free T4 reflex; Future    Chronic idiopathic constipation  -     polyethylene glycol (GLYCOLAX) 17 GM/SCOOP powder; Take 17 g by mouth daily    Fatigue due to depression  -     TSH, 3rd generation with Free T4 reflex; Future          Subjective:      Patient ID: Jessica Ash is a 58 y.o. female.    She has fatigue from MS, depression, and potentially AUGIE.    She declines increase in anti-depressant medications.    She declines stimulant medications for ADHD.    Fatigue  Associated symptoms include fatigue.       The following portions of the patient's history were reviewed and updated as appropriate: She  has no past medical history on file.  She   Patient Active Problem List    Diagnosis Date Noted    Moderate episode of recurrent major depressive disorder (HCC) 03/08/2024    Cellulitis of neck 02/16/2024    Alcohol abuse 08/30/2023    Former smoker 08/30/2023    Family history of coronary arteriosclerosis 08/30/2023    Osteopenia 03/03/2023    Recurrent depression (HCC) 03/03/2023    Bilateral sciatica 03/03/2023    Gastroesophageal reflux disease 02/02/2022    Multiple sclerosis (HCC) 07/19/2021    BMI 29.0-29.9,adult 01/09/2020    Fatigue due to depression  2019    Major depressive disorder 2018     She  has a past surgical history that includes  section; Knee arthroscopy; Vaginal hysterectomy; and Replacement total knee (Right).  Her family history includes Coronary artery disease in her father; Diabetes in her mother; Hyperlipidemia in her mother; Hypertension in her mother; Osteoporosis in her mother; Other in her mother.  She  reports that she quit smoking about 12 years ago. Her smoking use included cigarettes. She has never used smokeless tobacco. She reports current alcohol use. She reports that she does not use drugs.  Current Outpatient Medications   Medication Sig Dispense Refill    Alcohol Swabs (Alcohol Prep) PADS use as directed      Ascorbic Acid (Vitamin C) 500 MG CAPS Take by mouth      Cholecalciferol (Vitamin D) 50 MCG (2000 UT) CAPS Take by mouth      citalopram (CeleXA) 40 mg tablet Take 1 tablet (40 mg total) by mouth daily 90 tablet 3    ibuprofen (MOTRIN) 200 mg tablet Take 200 mg by mouth every 6 (six) hours as needed      lisdexamfetamine (Vyvanse) 30 MG capsule Take 1 capsule (30 mg total) by mouth every morning Max Daily Amount: 30 mg 30 capsule 0    Multiple Vitamin (Multivitamin Adult) TABS Take by mouth      omeprazole (PriLOSEC) 40 MG capsule Take 1 capsule (40 mg total) by mouth every morning 90 capsule 3    polyethylene glycol (GLYCOLAX) 17 GM/SCOOP powder Take 17 g by mouth daily 765 g 5    clobetasol (TEMOVATE) 0.05 % cream Apply topically 2 (two) times a day for 14 days (Patient not taking: Reported on 2024) 45 g 1     No current facility-administered medications for this visit.     Current Outpatient Medications on File Prior to Visit   Medication Sig    Alcohol Swabs (Alcohol Prep) PADS use as directed    Ascorbic Acid (Vitamin C) 500 MG CAPS Take by mouth    Cholecalciferol (Vitamin D) 50 MCG (2000 UT) CAPS Take by mouth    ibuprofen (MOTRIN) 200 mg tablet Take 200 mg by mouth every 6 (six) hours as needed     Multiple Vitamin (Multivitamin Adult) TABS Take by mouth    clobetasol (TEMOVATE) 0.05 % cream Apply topically 2 (two) times a day for 14 days (Patient not taking: Reported on 2/16/2024)     No current facility-administered medications on file prior to visit.     She is allergic to augmentin [amoxicillin-pot clavulanate]..    Review of Systems   Constitutional:  Positive for fatigue.   All other systems reviewed and are negative.        Objective:      /70 (BP Location: Left arm, Patient Position: Sitting)   Pulse 75   Temp 97.7 °F (36.5 °C) (Tympanic)   Wt 71.7 kg (158 lb)   SpO2 98%   BMI 30.86 kg/m²          Physical Exam  Vitals and nursing note reviewed.   Constitutional:       Appearance: Normal appearance. She is obese.   Cardiovascular:      Rate and Rhythm: Normal rate and regular rhythm.      Pulses: Normal pulses.      Heart sounds: Normal heart sounds.   Pulmonary:      Effort: Pulmonary effort is normal.      Breath sounds: Normal breath sounds.   Abdominal:      General: Abdomen is flat. Bowel sounds are normal.      Palpations: Abdomen is soft.   Musculoskeletal:         General: Normal range of motion.      Cervical back: Normal range of motion and neck supple.   Skin:     General: Skin is warm and dry.      Capillary Refill: Capillary refill takes less than 2 seconds.   Neurological:      General: No focal deficit present.      Mental Status: She is alert and oriented to person, place, and time. Mental status is at baseline.   Psychiatric:         Mood and Affect: Mood normal.         Behavior: Behavior normal.         Thought Content: Thought content normal.         Judgment: Judgment normal.

## 2024-04-30 ENCOUNTER — TELEPHONE (OUTPATIENT)
Age: 59
End: 2024-04-30

## 2024-04-30 DIAGNOSIS — F10.21 ALCOHOL USE DISORDER, MODERATE, IN EARLY REMISSION (HCC): Primary | ICD-10-CM

## 2024-04-30 RX ORDER — NALTREXONE HYDROCHLORIDE 50 MG/1
50 TABLET, FILM COATED ORAL DAILY
Qty: 90 TABLET | Refills: 3 | Status: SHIPPED | OUTPATIENT
Start: 2024-04-30

## 2024-04-30 NOTE — TELEPHONE ENCOUNTER
Patient called in and states that she spoke with  at her last appointment in march about medication to help her cravings for alcohol and she is now interested and requesting for this medication to be sent to her pharmacy. Please advise.

## 2024-05-22 ENCOUNTER — OFFICE VISIT (OUTPATIENT)
Dept: FAMILY MEDICINE CLINIC | Facility: CLINIC | Age: 59
End: 2024-05-22
Payer: COMMERCIAL

## 2024-05-22 ENCOUNTER — TELEPHONE (OUTPATIENT)
Age: 59
End: 2024-05-22

## 2024-05-22 VITALS
TEMPERATURE: 97.7 F | HEART RATE: 78 BPM | DIASTOLIC BLOOD PRESSURE: 60 MMHG | OXYGEN SATURATION: 98 % | SYSTOLIC BLOOD PRESSURE: 120 MMHG | BODY MASS INDEX: 29.41 KG/M2 | WEIGHT: 150.6 LBS

## 2024-05-22 DIAGNOSIS — R21 RASH: ICD-10-CM

## 2024-05-22 DIAGNOSIS — R39.9 UTI SYMPTOMS: Primary | ICD-10-CM

## 2024-05-22 PROCEDURE — 99213 OFFICE O/P EST LOW 20 MIN: CPT

## 2024-05-22 PROCEDURE — G2211 COMPLEX E/M VISIT ADD ON: HCPCS

## 2024-05-22 RX ORDER — SULFAMETHOXAZOLE AND TRIMETHOPRIM 800; 160 MG/1; MG/1
1 TABLET ORAL 2 TIMES DAILY
Qty: 6 TABLET | Refills: 0 | Status: SHIPPED | OUTPATIENT
Start: 2024-05-22 | End: 2024-05-25

## 2024-05-22 NOTE — TELEPHONE ENCOUNTER
Appointment scheduled with provider.    Reason: Office Visit    Symptoms: Frequent urination, burning    Provider: Jorge Zhang PA-C     Date/Time: Wednesday 5/22/2024 at 4:40 pm

## 2024-05-22 NOTE — PROGRESS NOTES
Ambulatory Visit  Name: Jessica Ash      : 1965      MRN: [id number]  Encounter Provider: Jorge Zhang PA-C  Encounter Date: 2024   Encounter department: [facility]    Assessment & Plan   1. UTI symptoms  -     sulfamethoxazole-trimethoprim (BACTRIM DS) 800-160 mg per tablet; Take 1 tablet by mouth 2 (two) times a day for 3 days  -     Urinalysis with microscopic; Future  -     Urine culture; Future  2. Rash  -     hydrocortisone 2.5 % cream; Apply topically 2 (two) times a day     Patient is prescribed Bactrim for UTI symptoms and will follow-up pending urinalysis and urine culture results.  Patient educated on side effects of medication and symptoms of worsening illness, and is to go to the ER if any of these occur.  Patient is also prescribed hydrocortisone 2 times a day for her rash.  She already has an appointment scheduled with dermatology in September.  Patient is to contact office in 1 to 2 weeks with rash progression.  If any worsening she should contact the office earlier.  History of Present Illness     Patient is a 58-year-old female presenting with UTI symptoms since today and a rash for 2 months.  The rash started behind her ears after her  passed away.  She has been stressed since then, and they are bumps behind the ears.  Denies pruritus, discharge, fevers, chills, changes in lotions/deodorants/perfumes, scaling, wounds, infestations.  Has not tried anything for this except for washing a good.  Patient states she does pick at them at times.    Urinary Tract Infection   This is a new problem. The current episode started today. The problem has been unchanged. The quality of the pain is described as burning. Associated symptoms include frequency, hematuria and urgency. Pertinent negatives include no chills, discharge, flank pain, hesitancy, nausea, possible pregnancy, sweats or vomiting. She has tried increased fluids for the symptoms. The treatment  provided no relief. There is no history of catheterization, kidney stones, recurrent UTIs, a single kidney, urinary stasis or a urological procedure.       Review of Systems   Constitutional:  Negative for appetite change, chills, diaphoresis, fatigue and fever.   HENT:  Negative for congestion, ear discharge, ear pain, postnasal drip, rhinorrhea, sinus pressure, sinus pain, sneezing and sore throat.    Eyes:  Negative for pain, discharge, redness, itching and visual disturbance.   Respiratory:  Negative for apnea, cough, chest tightness, shortness of breath and wheezing.    Cardiovascular:  Negative for chest pain, palpitations and leg swelling.   Gastrointestinal:  Negative for abdominal pain, blood in stool, constipation, diarrhea, nausea and vomiting.   Endocrine: Negative for cold intolerance, heat intolerance, polydipsia and polyuria.   Genitourinary:  Positive for frequency, hematuria and urgency. Negative for dysuria, flank pain and hesitancy.   Musculoskeletal:  Negative for arthralgias, back pain, myalgias, neck pain and neck stiffness.   Skin:  Positive for rash. Negative for color change.   Allergic/Immunologic: Negative for environmental allergies and food allergies.   Neurological:  Negative for dizziness, tremors, seizures, syncope, speech difficulty, weakness, light-headedness, numbness and headaches.   Hematological:  Negative for adenopathy. Does not bruise/bleed easily.   Psychiatric/Behavioral:  Negative for agitation, confusion, decreased concentration, dysphoric mood, hallucinations, self-injury, sleep disturbance and suicidal ideas. The patient is not nervous/anxious and is not hyperactive.    All other systems reviewed and are negative.      Objective     /60 (BP Location: Right arm, Patient Position: Sitting)   Pulse 78   Temp 97.7 °F (36.5 °C) (Tympanic)   Wt 68.3 kg (150 lb 9.6 oz)   SpO2 98%   BMI 29.41 kg/m²     Physical Exam  Vitals and nursing note reviewed.    Constitutional:       General: She is not in acute distress.     Appearance: Normal appearance. She is well-developed and normal weight. She is not ill-appearing, toxic-appearing or diaphoretic.   HENT:      Head: Normocephalic and atraumatic.      Right Ear: Tympanic membrane normal.      Left Ear: Tympanic membrane normal.      Nose: Nose normal.      Mouth/Throat:      Mouth: Mucous membranes are moist.      Pharynx: Oropharynx is clear.   Eyes:      Conjunctiva/sclera: Conjunctivae normal.      Pupils: Pupils are equal, round, and reactive to light.   Cardiovascular:      Rate and Rhythm: Normal rate and regular rhythm.      Pulses: Normal pulses.      Heart sounds: Normal heart sounds. No murmur heard.  Pulmonary:      Effort: Pulmonary effort is normal. No respiratory distress.      Breath sounds: Normal breath sounds. No wheezing.   Chest:      Chest wall: No tenderness.   Abdominal:      General: Bowel sounds are normal.      Palpations: Abdomen is soft. There is no mass.      Tenderness: There is no abdominal tenderness.   Musculoskeletal:         General: No swelling or tenderness.      Cervical back: Normal range of motion and neck supple. No tenderness.      Right lower leg: No edema.      Left lower leg: No edema.   Lymphadenopathy:      Cervical: No cervical adenopathy.   Skin:     General: Skin is warm and dry.      Capillary Refill: Capillary refill takes less than 2 seconds.      Findings: Rash (Erythematous maculopapular rash behind each ear.) present. No erythema or lesion.   Neurological:      General: No focal deficit present.      Mental Status: She is alert and oriented to person, place, and time. Mental status is at baseline.      Motor: No weakness.      Coordination: Coordination normal.      Gait: Gait normal.   Psychiatric:         Mood and Affect: Mood normal.         Behavior: Behavior normal.         Thought Content: Thought content normal.         Judgment: Judgment normal.        Administrative Statements

## 2024-05-23 LAB
BACTERIA UR QL AUTO: ABNORMAL /HPF
BILIRUB UR QL STRIP: NEGATIVE
CAOX CRY URNS QL MICRO: ABNORMAL /HPF
CLARITY UR: ABNORMAL
COLOR UR: YELLOW
GLUCOSE UR STRIP-MCNC: NEGATIVE MG/DL
HGB UR QL STRIP.AUTO: ABNORMAL
KETONES UR STRIP-MCNC: NEGATIVE MG/DL
LEUKOCYTE ESTERASE UR QL STRIP: ABNORMAL
NITRITE UR QL STRIP: POSITIVE
NON-SQ EPI CELLS URNS QL MICRO: ABNORMAL /HPF
PH UR STRIP.AUTO: 7 [PH]
PROT UR STRIP-MCNC: ABNORMAL MG/DL
RBC #/AREA URNS AUTO: ABNORMAL /HPF
SP GR UR STRIP.AUTO: 1.01 (ref 1–1.03)
UROBILINOGEN UR STRIP-ACNC: <2 MG/DL
WBC #/AREA URNS AUTO: ABNORMAL /HPF

## 2024-05-23 PROCEDURE — 87186 SC STD MICRODIL/AGAR DIL: CPT

## 2024-05-23 PROCEDURE — 87086 URINE CULTURE/COLONY COUNT: CPT

## 2024-05-23 PROCEDURE — 87077 CULTURE AEROBIC IDENTIFY: CPT

## 2024-05-23 PROCEDURE — 81001 URINALYSIS AUTO W/SCOPE: CPT

## 2024-05-25 LAB — BACTERIA UR CULT: ABNORMAL

## 2024-05-28 ENCOUNTER — TELEPHONE (OUTPATIENT)
Dept: FAMILY MEDICINE CLINIC | Facility: CLINIC | Age: 59
End: 2024-05-28

## 2024-05-28 DIAGNOSIS — R39.9 UTI SYMPTOMS: Primary | ICD-10-CM

## 2024-05-28 RX ORDER — NITROFURANTOIN 25; 75 MG/1; MG/1
100 CAPSULE ORAL 2 TIMES DAILY
Qty: 10 CAPSULE | Refills: 0 | Status: SHIPPED | OUTPATIENT
Start: 2024-05-28 | End: 2024-06-02

## 2024-05-28 NOTE — TELEPHONE ENCOUNTER
----- Message from Jorge Zhang PA-C sent at 5/28/2024  8:18 AM EDT -----  Okay I sent another antibiotic over for her, because if she is still having burning I am worried that the infection is still there, and if not taking care of it could worsen.  It is twice a day for 5 days.  ----- Message -----  From: Jacy Chaney  Sent: 5/28/2024   8:17 AM EDT  To: Jorge Zhang PA-C    Patient aware.  States she still has a little burning when she urinates, but feels much better.  She said she finished the antibiotic.

## 2024-05-28 NOTE — TELEPHONE ENCOUNTER
----- Message from Jorge Zhang PA-C sent at 5/23/2024  4:55 PM EDT -----  White blood cells and bacteria in urine.  Continue antibiotic.  How are her symptoms?

## 2024-05-28 NOTE — TELEPHONE ENCOUNTER
Patient aware.  States she still has a little burning when she urinates, but feels much better.  She said she finished the antibiotic.

## 2024-08-26 LAB — TSH SERPL-ACNC: 0.85 UIU/ML (ref 0.45–5.33)

## 2024-09-03 ENCOUNTER — TELEPHONE (OUTPATIENT)
Age: 59
End: 2024-09-03

## 2024-09-14 ENCOUNTER — RA CDI HCC (OUTPATIENT)
Dept: OTHER | Facility: HOSPITAL | Age: 59
End: 2024-09-14

## 2024-09-15 NOTE — PROGRESS NOTES
HCC coding opportunities          Chart Reviewed number of suggestions sent to Provider: 1  F10.21     Patients Insurance     Medicare Insurance: [employer]          
adrianne all pertinent systems normal

## 2024-09-17 ENCOUNTER — OFFICE VISIT (OUTPATIENT)
Dept: FAMILY MEDICINE CLINIC | Facility: CLINIC | Age: 59
End: 2024-09-17
Payer: COMMERCIAL

## 2024-09-17 VITALS
OXYGEN SATURATION: 98 % | HEIGHT: 60 IN | DIASTOLIC BLOOD PRESSURE: 76 MMHG | WEIGHT: 142.4 LBS | BODY MASS INDEX: 27.96 KG/M2 | TEMPERATURE: 98.4 F | SYSTOLIC BLOOD PRESSURE: 132 MMHG | HEART RATE: 83 BPM

## 2024-09-17 DIAGNOSIS — R17 ELEVATED BILIRUBIN: ICD-10-CM

## 2024-09-17 DIAGNOSIS — F33.1 MODERATE EPISODE OF RECURRENT MAJOR DEPRESSIVE DISORDER (HCC): ICD-10-CM

## 2024-09-17 DIAGNOSIS — R21 RASH: ICD-10-CM

## 2024-09-17 DIAGNOSIS — Z00.00 MEDICARE ANNUAL WELLNESS VISIT, SUBSEQUENT: Primary | ICD-10-CM

## 2024-09-17 DIAGNOSIS — E28.319 ASYMPTOMATIC PREMATURE MENOPAUSE: ICD-10-CM

## 2024-09-17 DIAGNOSIS — K21.9 GASTROESOPHAGEAL REFLUX DISEASE WITHOUT ESOPHAGITIS: ICD-10-CM

## 2024-09-17 DIAGNOSIS — E78.00 ELEVATED CHOLESTEROL: ICD-10-CM

## 2024-09-17 DIAGNOSIS — G35 MULTIPLE SCLEROSIS (HCC): ICD-10-CM

## 2024-09-17 DIAGNOSIS — M85.80 OSTEOPENIA, UNSPECIFIED LOCATION: ICD-10-CM

## 2024-09-17 DIAGNOSIS — F10.21 ALCOHOL DEPENDENCE, IN REMISSION (HCC): ICD-10-CM

## 2024-09-17 DIAGNOSIS — F33.9 RECURRENT DEPRESSION (HCC): ICD-10-CM

## 2024-09-17 PROCEDURE — G0439 PPPS, SUBSEQ VISIT: HCPCS

## 2024-09-17 RX ORDER — METRONIDAZOLE 10 MG/G
GEL TOPICAL DAILY
Qty: 60 G | Refills: 1 | Status: SHIPPED | OUTPATIENT
Start: 2024-09-17 | End: 2024-09-17

## 2024-09-17 NOTE — ASSESSMENT & PLAN NOTE
Depression Screening Follow-up Plan: Patient's depression screening was positive with a PHQ-9 score of 18. Patient assessed for underlying major depression. They have no active suicidal ideations. Brief counseling provided and recommend additional follow-up/re-evaluation next office visit.  Denies SI/HI.  Declines medication or counseling.  Contact office if you would like to complete this.

## 2024-09-17 NOTE — ASSESSMENT & PLAN NOTE
Depression Screening Follow-up Plan: Patient's depression screening was positive with a PHQ-9 score of 18. Patient assessed for underlying major depression. They have no active suicidal ideations. Brief counseling provided and recommend additional follow-up/re-evaluation next office visit.

## 2024-09-17 NOTE — ASSESSMENT & PLAN NOTE
Follows with specialty.  Continue follow-up with specialty.  Orders:    CBC and differential; Future    CBC and differential

## 2024-09-17 NOTE — ASSESSMENT & PLAN NOTE
Will get repeat DEXA.  Follow-up pending results.  Educated on fall risk.  Orders:    DXA bone density spine hip and pelvis; Future

## 2024-09-17 NOTE — PROGRESS NOTES
Ambulatory Visit  Name: Jessica Ash      : 1965      MRN: [id number]  Encounter Provider: Jorge Zhang PA-C  Encounter Date: 2024   Encounter department: [facility]    Assessment & Plan  Medicare annual wellness visit, subsequent         Gastroesophageal reflux disease without esophagitis  Stable.  Continue current.  No red flag signs.       Multiple sclerosis (HCC)  Follows with specialty.  Continue follow-up with specialty.  Orders:    CBC and differential; Future    CBC and differential    Osteopenia, unspecified location  Will get repeat DEXA.  Follow-up pending results.  Educated on fall risk.  Orders:    DXA bone density spine hip and pelvis; Future    Moderate episode of recurrent major depressive disorder (HCC)  Depression Screening Follow-up Plan: Patient's depression screening was positive with a PHQ-9 score of 18. Patient assessed for underlying major depression. They have no active suicidal ideations. Brief counseling provided and recommend additional follow-up/re-evaluation next office visit.  Denies SI/HI.  Declines medication or counseling.  Contact office if you would like to complete this.       Recurrent depression (HCC)  Depression Screening Follow-up Plan: Patient's depression screening was positive with a PHQ-9 score of 18. Patient assessed for underlying major depression. They have no active suicidal ideations. Brief counseling provided and recommend additional follow-up/re-evaluation next office visit.         Elevated bilirubin  Will follow-up pending lab results  Orders:    Comprehensive metabolic panel; Future    Comprehensive metabolic panel    Elevated cholesterol  Will follow-up pending lab results.  Educated on healthy diet and activity  Orders:    Lipid panel; Future    Lipid panel    Rash  Continue follow-up with dermatology.       Alcohol dependence, in remission (HCC)  Continue cessation, praised for this.       Asymptomatic premature  menopause    Orders:    DXA bone density spine hip and pelvis; Future      Depression Screening and Follow-up Plan: Patient's depression screening was positive with a PHQ-9 score of 18. Patient assessed for underlying major depression. Brief counseling provided and recommend additional follow-up/re-evaluation next office visit.     Preventive health issues were discussed with patient, and age appropriate screening tests were ordered as noted in patient's After Visit Summary. Personalized health advice and appropriate referrals for health education or preventive services given if needed, as noted in patient's After Visit Summary.    History of Present Illness     Patient is a 59-year-old female presenting for Medicare annual wellness visit.  Patient still has bumps on her neck despite hydrocortisone use.  States they will not bother her.  They are just there.,  Come during a period of increased stress.  Has appointment with dermatology in December.  Would like to wait till this to do any further treatment.  Patient also has bilateral calf cramps.  This has been longstanding.  Tried potassium with no relief.  Did not tolerate potassium.  Denies swelling, shortness of breath, trauma, long periods of immobility, chest pain, pleuritic chest pain.  No other concerns today.       Patient Care Team:  Jorge Zhang PA-C as PCP - General (Family Medicine)  Misael Sarmiento MD as PCP - Family Medicine (Family Medicine)  Kevin Hickman PA-C as Neurologist (Physician Assistant)  Adri Bhatt MD (Ophthalmology)    Review of Systems   Constitutional:  Negative for appetite change, chills, diaphoresis, fatigue and fever.   HENT:  Negative for congestion, ear discharge, ear pain, postnasal drip, rhinorrhea, sinus pressure, sinus pain, sneezing and sore throat.    Eyes:  Negative for pain, discharge, redness, itching and visual disturbance.   Respiratory:  Negative for apnea, cough, chest tightness, shortness of breath and  wheezing.    Cardiovascular:  Negative for chest pain, palpitations and leg swelling.   Gastrointestinal:  Negative for abdominal pain, blood in stool, constipation, diarrhea, nausea and vomiting.   Endocrine: Negative for cold intolerance, heat intolerance, polydipsia and polyuria.   Genitourinary:  Negative for dysuria, flank pain, frequency, hematuria and urgency.   Musculoskeletal:  Positive for myalgias. Negative for arthralgias, back pain, neck pain and neck stiffness.   Skin:  Positive for rash. Negative for color change.   Allergic/Immunologic: Negative for environmental allergies and food allergies.   Neurological:  Negative for dizziness, tremors, seizures, syncope, speech difficulty, weakness, light-headedness, numbness and headaches.   Hematological:  Negative for adenopathy. Does not bruise/bleed easily.   Psychiatric/Behavioral:  Positive for dysphoric mood. Negative for agitation, confusion, decreased concentration, hallucinations, self-injury, sleep disturbance and suicidal ideas. The patient is not nervous/anxious and is not hyperactive.    All other systems reviewed and are negative.    Medical History Reviewed by provider this encounter:       Annual Wellness Visit Questionnaire       Health Risk Assessment:   Patient rates overall health as good. Patient feels that their physical health rating is same. Patient is satisfied with their life. Eyesight was rated as same. Hearing was rated as same. Patient feels that their emotional and mental health rating is same. Patients states they are sometimes angry. Patient states they are often unusually tired/fatigued. Pain experienced in the last 7 days has been some. Patient's pain rating has been 3/10. Patient states that she has experienced weight loss or gain in last 6 months.     Depression Screening:   PHQ-9 Score: 18      Fall Risk Screening:   In the past year, patient has experienced: no history of falling in past year      Urinary Incontinence  Screening:   Patient has leaked urine accidently in the last six months.     Home Safety:  Patient does not have trouble with stairs inside or outside of their home. Patient has working smoke alarms and has working carbon monoxide detector. Home safety hazards include: none.     Nutrition:   Current diet is Regular.     Medications:   Patient is currently taking over-the-counter supplements. OTC medications include: see medication list. Patient is able to manage medications.     Activities of Daily Living (ADLs)/Instrumental Activities of Daily Living (IADLs):   Walk and transfer into and out of bed and chair?: Yes  Dress and groom yourself?: Yes    Bathe or shower yourself?: Yes    Feed yourself? Yes  Do your laundry/housekeeping?: Yes  Manage your money, pay your bills and track your expenses?: Yes  Make your own meals?: Yes    Do your own shopping?: Yes    Previous Hospitalizations:   Any hospitalizations or ED visits within the last 12 months?: No      Advance Care Planning:   Living will: Yes    Durable POA for healthcare: Yes    Advanced directive: Yes    Advanced directive counseling given: Yes    Five wishes given: No    Patient declined ACP directive: No    End of Life Decisions reviewed with patient: Yes    Provider agrees with end of life decisions: Yes      Cognitive Screening:   Provider or family/friend/caregiver concerned regarding cognition?: No    PREVENTIVE SCREENINGS      Cardiovascular Screening:    General: Screening Current      Diabetes Screening:     General: Screening Current      Colorectal Cancer Screening:     General: Screening Current      Breast Cancer Screening:     General: Screening Current      Cervical Cancer Screening:    General: Screening Current      Lung Cancer Screening:     General: Screening Not Indicated      Hepatitis C Screening:    General: Screening Current    Screening, Brief Intervention, and Referral to Treatment (SBIRT)    Screening      AUDIT-C Screenin) How  often did you have a drink containing alcohol in the past year? 4 or more times a week  2) How many drinks did you have on a typical day when you were drinking in the past year? 1 to 2  3) How often did you have 6 or more drinks on one occasion in the past year? never    AUDIT-C Score: 4  Interpretation: Score 3-12 (female): POSITIVE screen for alcohol misuse    AUDIT Screenin) How often during the last year have you found that you were not able to stop drinking once you had started? 0 - never  5) How often during the last year have you failed to do what was normally expected from you because of drinking? 0 - never  6) How often during the last year have you needed a first drink in the morning to get yourself going after a heavy drinking session? 0 - never  7) How often during the last year have you had a feeling of guilt or remorse after drinking? 0 - never  8) How often during the last year have you been unable to remember what happened the night before because you had been drinking? 0 - never  9) Have you or someone else been injured as a result of your drinking? 0 - no  10) Has a relative or friend or a doctor or another health worker been concerned about your drinking or suggested you cut down? 0 - no    AUDIT Score: 4  Interpretation: Low risk alcohol consumption    Brief Intervention  Alcohol & drug use screenings were reviewed. No concerns regarding substance use disorder identified.     Other Counseling Topics:   Car/seat belt/driving safety, skin self-exam, sunscreen and calcium and vitamin D intake and regular weightbearing exercise.     Social Determinants of Health     Financial Resource Strain: Low Risk  (2023)    Overall Financial Resource Strain (CARDIA)     Difficulty of Paying Living Expenses: Not hard at all   Food Insecurity: No Food Insecurity (2024)    Hunger Vital Sign     Worried About Running Out of Food in the Last Year: Never true     Ran Out of Food in the Last Year: Never  true   Transportation Needs: No Transportation Needs (9/17/2024)    PRAPARE - Transportation     Lack of Transportation (Medical): No     Lack of Transportation (Non-Medical): No   Housing Stability: Low Risk  (9/17/2024)    Housing Stability Vital Sign     Unable to Pay for Housing in the Last Year: No     Number of Times Moved in the Last Year: 1     Homeless in the Last Year: No   Utilities: Not At Risk (9/17/2024)    [facility] Utilities     Threatened with loss of utilities: No     No results found.    Objective     /76 (BP Location: Left arm, Patient Position: Sitting)   Pulse 83   Temp 98.4 °F (36.9 °C) (Tympanic)   Ht 5' (1.524 m)   Wt 64.6 kg (142 lb 6.4 oz)   SpO2 98%   BMI 27.81 kg/m²     Physical Exam  Vitals and nursing note reviewed.   Constitutional:       General: She is not in acute distress.     Appearance: Normal appearance. She is well-developed and normal weight. She is not ill-appearing, toxic-appearing or diaphoretic.   HENT:      Head: Normocephalic and atraumatic.      Right Ear: Tympanic membrane normal.      Left Ear: Tympanic membrane normal.      Nose: Nose normal.      Mouth/Throat:      Mouth: Mucous membranes are moist.      Pharynx: Oropharynx is clear.   Eyes:      Conjunctiva/sclera: Conjunctivae normal.      Pupils: Pupils are equal, round, and reactive to light.   Cardiovascular:      Rate and Rhythm: Normal rate and regular rhythm.      Pulses: Normal pulses.      Heart sounds: Normal heart sounds. No murmur heard.  Pulmonary:      Effort: Pulmonary effort is normal. No respiratory distress.      Breath sounds: Normal breath sounds. No wheezing.   Chest:      Chest wall: No tenderness.   Abdominal:      General: Bowel sounds are normal.      Palpations: Abdomen is soft. There is no mass.      Tenderness: There is no abdominal tenderness.   Musculoskeletal:         General: No swelling or tenderness. Normal range of motion.      Cervical back: Normal range of motion and neck  supple. No tenderness.      Right lower leg: No edema.      Left lower leg: No edema.   Lymphadenopathy:      Cervical: No cervical adenopathy.   Skin:     General: Skin is warm and dry.      Capillary Refill: Capillary refill takes less than 2 seconds.      Findings: No erythema, lesion or rash.   Neurological:      General: No focal deficit present.      Mental Status: She is alert and oriented to person, place, and time. Mental status is at baseline.      Motor: No weakness.      Coordination: Coordination normal.      Gait: Gait normal.   Psychiatric:         Mood and Affect: Mood normal.         Behavior: Behavior normal.         Thought Content: Thought content normal.         Judgment: Judgment normal.

## 2024-09-17 NOTE — PATIENT INSTRUCTIONS
Medicare Preventive Visit Patient Instructions  Thank you for completing your Welcome to Medicare Visit or Medicare Annual Wellness Visit today. Your next wellness visit will be due in one year (9/18/2025).  The screening/preventive services that you may require over the next 5-10 years are detailed below. Some tests may not apply to you based off risk factors and/or age. Screening tests ordered at today's visit but not completed yet may show as past due. Also, please note that scanned in results may not display below.  Preventive Screenings:  Service Recommendations Previous Testing/Comments   Colorectal Cancer Screening  * Colonoscopy    * Fecal Occult Blood Test (FOBT)/Fecal Immunochemical Test (FIT)  * Fecal DNA/Cologuard Test  * Flexible Sigmoidoscopy Age: 45-75 years old   Colonoscopy: every 10 years (may be performed more frequently if at higher risk)  OR  FOBT/FIT: every 1 year  OR  Cologuard: every 3 years  OR  Sigmoidoscopy: every 5 years  Screening may be recommended earlier than age 45 if at higher risk for colorectal cancer. Also, an individualized decision between you and your healthcare provider will decide whether screening between the ages of 76-85 would be appropriate. Colonoscopy: 04/14/2015  FOBT/FIT: Not on file  Cologuard: Not on file  Sigmoidoscopy: Not on file          Breast Cancer Screening Age: 40+ years old  Frequency: every 1-2 years  Not required if history of left and right mastectomy Mammogram: 01/22/2024        Cervical Cancer Screening Between the ages of 21-29, pap smear recommended once every 3 years.   Between the ages of 30-65, can perform pap smear with HPV co-testing every 5 years.   Recommendations may differ for women with a history of total hysterectomy, cervical cancer, or abnormal pap smears in past. Pap Smear: 11/01/2022        Hepatitis C Screening Once for adults born between 1945 and 1965  More frequently in patients at high risk for Hepatitis C Hep C Antibody:  12/13/2018        Diabetes Screening 1-2 times per year if you're at risk for diabetes or have pre-diabetes Fasting glucose: No results in last 5 years (No results in last 5 years)  A1C: 5.6 % (3/4/2024)      Cholesterol Screening Once every 5 years if you don't have a lipid disorder. May order more often based on risk factors. Lipid panel: 03/04/2024          Other Preventive Screenings Covered by Medicare:  Abdominal Aortic Aneurysm (AAA) Screening: covered once if your at risk. You're considered to be at risk if you have a family history of AAA.  Lung Cancer Screening: covers low dose CT scan once per year if you meet all of the following conditions: (1) Age 55-77; (2) No signs or symptoms of lung cancer; (3) Current smoker or have quit smoking within the last 15 years; (4) You have a tobacco smoking history of at least 20 pack years (packs per day multiplied by number of years you smoked); (5) You get a written order from a healthcare provider.  Glaucoma Screening: covered annually if you're considered high risk: (1) You have diabetes OR (2) Family history of glaucoma OR (3)  aged 50 and older OR (4)  American aged 65 and older  Osteoporosis Screening: covered every 2 years if you meet one of the following conditions: (1) You're estrogen deficient and at risk for osteoporosis based off medical history and other findings; (2) Have a vertebral abnormality; (3) On glucocorticoid therapy for more than 3 months; (4) Have primary hyperparathyroidism; (5) On osteoporosis medications and need to assess response to drug therapy.   Last bone density test (DXA Scan): Not on file.  HIV Screening: covered annually if you're between the age of 15-65. Also covered annually if you are younger than 15 and older than 65 with risk factors for HIV infection. For pregnant patients, it is covered up to 3 times per pregnancy.    Immunizations:  Immunization Recommendations   Influenza Vaccine Annual influenza  vaccination during flu season is recommended for all persons aged >= 6 months who do not have contraindications   Pneumococcal Vaccine   * Pneumococcal conjugate vaccine = PCV13 (Prevnar 13), PCV15 (Vaxneuvance), PCV20 (Prevnar 20)  * Pneumococcal polysaccharide vaccine = PPSV23 (Pneumovax) Adults 19-65 yo with certain risk factors or if 65+ yo  If never received any pneumonia vaccine: recommend Prevnar 20 (PCV20)  Give PCV20 if previously received 1 dose of PCV13 or PPSV23   Hepatitis B Vaccine 3 dose series if at intermediate or high risk (ex: diabetes, end stage renal disease, liver disease)   Respiratory syncytial virus (RSV) Vaccine - COVERED BY MEDICARE PART D  * RSVPreF3 (Arexvy) CDC recommends that adults 60 years of age and older may receive a single dose of RSV vaccine using shared clinical decision-making (SCDM)   Tetanus (Td) Vaccine - COST NOT COVERED BY MEDICARE PART B Following completion of primary series, a booster dose should be given every 10 years to maintain immunity against tetanus. Td may also be given as tetanus wound prophylaxis.   Tdap Vaccine - COST NOT COVERED BY MEDICARE PART B Recommended at least once for all adults. For pregnant patients, recommended with each pregnancy.   Shingles Vaccine (Shingrix) - COST NOT COVERED BY MEDICARE PART B  2 shot series recommended in those 19 years and older who have or will have weakened immune systems or those 50 years and older     Health Maintenance Due:      Topic Date Due   • Breast Cancer Screening: Mammogram  01/22/2025   • Colorectal Cancer Screening  04/14/2025   • HIV Screening  Completed   • Hepatitis C Screening  Completed   • Cervical Cancer Screening  Discontinued     Immunizations Due:      Topic Date Due   • Hepatitis A Vaccine (1 of 2 - Risk 2-dose series) Never done   • COVID-19 Vaccine (5 - 2023-24 season) 09/01/2024   • Influenza Vaccine (1) 09/01/2024     Advance Directives   What are advance directives?  Advance directives are  legal documents that state your wishes and plans for medical care. These plans are made ahead of time in case you lose your ability to make decisions for yourself. Advance directives can apply to any medical decision, such as the treatments you want, and if you want to donate organs.   What are the types of advance directives?  There are many types of advance directives, and each state has rules about how to use them. You may choose a combination of any of the following:  Living will:  This is a written record of the treatment you want. You can also choose which treatments you do not want, which to limit, and which to stop at a certain time. This includes surgery, medicine, IV fluid, and tube feedings.   Durable power of  for healthcare (DPAHC):  This is a written record that states who you want to make healthcare choices for you when you are unable to make them for yourself. This person, called a proxy, is usually a family member or a friend. You may choose more than 1 proxy.  Do not resuscitate (DNR) order:  A DNR order is used in case your heart stops beating or you stop breathing. It is a request not to have certain forms of treatment, such as CPR. A DNR order may be included in other types of advance directives.  Medical directive:  This covers the care that you want if you are in a coma, near death, or unable to make decisions for yourself. You can list the treatments you want for each condition. Treatment may include pain medicine, surgery, blood transfusions, dialysis, IV or tube feedings, and a ventilator (breathing machine).  Values history:  This document has questions about your views, beliefs, and how you feel and think about life. This information can help others choose the care that you would choose.  Why are advance directives important?  An advance directive helps you control your care. Although spoken wishes may be used, it is better to have your wishes written down. Spoken wishes can be  misunderstood, or not followed. Treatments may be given even if you do not want them. An advance directive may make it easier for your family to make difficult choices about your care.   Weight Management   Why it is important to manage your weight:  Being overweight increases your risk of health conditions such as heart disease, high blood pressure, type 2 diabetes, and certain types of cancer. It can also increase your risk for osteoarthritis, sleep apnea, and other respiratory problems. Aim for a slow, steady weight loss. Even a small amount of weight loss can lower your risk of health problems.  How to lose weight safely:  A safe and healthy way to lose weight is to eat fewer calories and get regular exercise. You can lose up about 1 pound a week by decreasing the number of calories you eat by 500 calories each day.   Healthy meal plan for weight management:  A healthy meal plan includes a variety of foods, contains fewer calories, and helps you stay healthy. A healthy meal plan includes the following:  Eat whole-grain foods more often.  A healthy meal plan should contain fiber. Fiber is the part of grains, fruits, and vegetables that is not broken down by your body. Whole-grain foods are healthy and provide extra fiber in your diet. Some examples of whole-grain foods are whole-wheat breads and pastas, oatmeal, brown rice, and bulgur.  Eat a variety of vegetables every day.  Include dark, leafy greens such as spinach, kale, rose greens, and mustard greens. Eat yellow and orange vegetables such as carrots, sweet potatoes, and winter squash.   Eat a variety of fruits every day.  Choose fresh or canned fruit (canned in its own juice or light syrup) instead of juice. Fruit juice has very little or no fiber.  Eat low-fat dairy foods.  Drink fat-free (skim) milk or 1% milk. Eat fat-free yogurt and low-fat cottage cheese. Try low-fat cheeses such as mozzarella and other reduced-fat cheeses.  Choose meat and other  protein foods that are low in fat.  Choose beans or other legumes such as split peas or lentils. Choose fish, skinless poultry (chicken or turkey), or lean cuts of red meat (beef or pork). Before you cook meat or poultry, cut off any visible fat.   Use less fat and oil.  Try baking foods instead of frying them. Add less fat, such as margarine, sour cream, regular salad dressing and mayonnaise to foods. Eat fewer high-fat foods. Some examples of high-fat foods include french fries, doughnuts, ice cream, and cakes.  Eat fewer sweets.  Limit foods and drinks that are high in sugar. This includes candy, cookies, regular soda, and sweetened drinks.  Exercise:  Exercise at least 30 minutes per day on most days of the week. Some examples of exercise include walking, biking, dancing, and swimming. You can also fit in more physical activity by taking the stairs instead of the elevator or parking farther away from stores. Ask your healthcare provider about the best exercise plan for you.      © Copyright [employer] 2018 Information is for End User's use only and may not be sold, redistributed or otherwise used for commercial purposes. All illustrations and images included in CareNotes® are the copyrighted property of [employer].D.A[employer]M., Inc. or [employer]

## 2024-12-13 DIAGNOSIS — K21.9 GASTROESOPHAGEAL REFLUX DISEASE: ICD-10-CM

## 2024-12-13 RX ORDER — OMEPRAZOLE 40 MG/1
40 CAPSULE, DELAYED RELEASE ORAL EVERY MORNING
Qty: 90 CAPSULE | Refills: 1 | Status: SHIPPED | OUTPATIENT
Start: 2024-12-13

## 2025-01-06 ENCOUNTER — TELEPHONE (OUTPATIENT)
Age: 60
End: 2025-01-06

## 2025-01-06 NOTE — TELEPHONE ENCOUNTER
Pt called today and said she lost her lab orders and asked if you can please mail the lab orders to [phone] [phone] Thank you. ÁLVARO

## 2025-02-25 LAB
ALBUMIN SERPL-MCNC: 4.4 G/DL (ref 3.5–5.7)
ALP SERPL-CCNC: 60 U/L (ref 35–120)
ALT SERPL-CCNC: 20 U/L
ANION GAP SERPL CALCULATED.3IONS-SCNC: 8 MMOL/L (ref 3–11)
AST SERPL-CCNC: 17 U/L
BASOPHILS # BLD AUTO: 0.1 THOU/CMM (ref 0–0.1)
BASOPHILS NFR BLD AUTO: 1 %
BILIRUB SERPL-MCNC: 1 MG/DL (ref 0.2–1)
BUN SERPL-MCNC: 10 MG/DL (ref 7–25)
CALCIUM SERPL-MCNC: 9.9 MG/DL (ref 8.5–10.5)
CHLORIDE SERPL-SCNC: 103 MMOL/L (ref 100–109)
CHOLEST SERPL-MCNC: 234 MG/DL
CHOLEST/HDLC SERPL: 3.9 {RATIO}
CO2 SERPL-SCNC: 30 MMOL/L (ref 21–31)
CREAT SERPL-MCNC: 0.82 MG/DL (ref 0.4–1.1)
CYTOLOGY CMNT CVX/VAG CYTO-IMP: NORMAL
DIFFERENTIAL METHOD BLD: ABNORMAL
EOSINOPHIL # BLD AUTO: 0.8 THOU/CMM (ref 0–0.5)
EOSINOPHIL NFR BLD AUTO: 10 %
ERYTHROCYTE [DISTWIDTH] IN BLOOD BY AUTOMATED COUNT: 12.9 % (ref 12–16)
GFR/BSA.PRED SERPLBLD CYS-BASED-ARV: 81 ML/MIN/{1.73_M2}
GLUCOSE SERPL-MCNC: 96 MG/DL (ref 65–99)
HCT VFR BLD AUTO: 43.8 % (ref 35–43)
HDLC SERPL-MCNC: 60 MG/DL (ref 23–92)
HGB BLD-MCNC: 15.4 G/DL (ref 11.5–14.5)
LDLC SERPL CALC-MCNC: 133 MG/DL
LYMPHOCYTES # BLD AUTO: 2.3 THOU/CMM (ref 1–3)
LYMPHOCYTES NFR BLD AUTO: 29 %
MCH RBC QN AUTO: 33.1 PG (ref 26–34)
MCHC RBC AUTO-ENTMCNC: 35.1 G/DL (ref 32–37)
MCV RBC AUTO: 94 FL (ref 80–100)
MONOCYTES # BLD AUTO: 0.4 THOU/CMM (ref 0.3–1)
MONOCYTES NFR BLD AUTO: 5 %
NEUTROPHILS # BLD AUTO: 4.3 THOU/CMM (ref 1.8–7.8)
NEUTROPHILS NFR BLD AUTO: 55 %
NONHDLC SERPL-MCNC: 174 MG/DL
PLATELET # BLD AUTO: 349 THOU/CMM (ref 140–350)
PMV BLD REES-ECKER: 8.3 FL (ref 7.5–11.3)
POTASSIUM SERPL-SCNC: 4.7 MMOL/L (ref 3.5–5.2)
PROT SERPL-MCNC: 7 G/DL (ref 6.3–8.3)
RBC # BLD AUTO: 4.65 MILL/CMM (ref 3.7–4.7)
SODIUM SERPL-SCNC: 141 MMOL/L (ref 135–145)
TRIGL SERPL-MCNC: 205 MG/DL
WBC # BLD AUTO: 7.9 THOU/CMM (ref 4–10)

## 2025-02-26 ENCOUNTER — RESULTS FOLLOW-UP (OUTPATIENT)
Dept: FAMILY MEDICINE CLINIC | Facility: CLINIC | Age: 60
End: 2025-02-26

## 2025-02-26 DIAGNOSIS — E78.2 MIXED HYPERLIPIDEMIA: ICD-10-CM

## 2025-02-26 DIAGNOSIS — D58.2 ELEVATED HEMOGLOBIN (HCC): Primary | ICD-10-CM

## 2025-03-13 ENCOUNTER — RA CDI HCC (OUTPATIENT)
Dept: OTHER | Facility: HOSPITAL | Age: 60
End: 2025-03-13

## 2025-03-18 ENCOUNTER — OFFICE VISIT (OUTPATIENT)
Dept: FAMILY MEDICINE CLINIC | Facility: CLINIC | Age: 60
End: 2025-03-18
Payer: COMMERCIAL

## 2025-03-18 VITALS
OXYGEN SATURATION: 98 % | SYSTOLIC BLOOD PRESSURE: 104 MMHG | TEMPERATURE: 98.2 F | DIASTOLIC BLOOD PRESSURE: 70 MMHG | HEART RATE: 72 BPM | BODY MASS INDEX: 26.37 KG/M2 | WEIGHT: 135 LBS

## 2025-03-18 DIAGNOSIS — Z12.11 SCREEN FOR COLON CANCER: ICD-10-CM

## 2025-03-18 DIAGNOSIS — D58.2 ELEVATED HEMOGLOBIN (HCC): ICD-10-CM

## 2025-03-18 DIAGNOSIS — M81.0 AGE-RELATED OSTEOPOROSIS WITHOUT CURRENT PATHOLOGICAL FRACTURE: ICD-10-CM

## 2025-03-18 DIAGNOSIS — E55.9 VITAMIN D DEFICIENCY: ICD-10-CM

## 2025-03-18 DIAGNOSIS — F33.1 MODERATE EPISODE OF RECURRENT MAJOR DEPRESSIVE DISORDER (HCC): ICD-10-CM

## 2025-03-18 DIAGNOSIS — E53.8 VITAMIN B12 DEFICIENCY: ICD-10-CM

## 2025-03-18 DIAGNOSIS — F33.9 RECURRENT DEPRESSION (HCC): ICD-10-CM

## 2025-03-18 DIAGNOSIS — G89.29 CHRONIC PAIN OF BOTH SHOULDERS: ICD-10-CM

## 2025-03-18 DIAGNOSIS — G35 MULTIPLE SCLEROSIS (HCC): Primary | ICD-10-CM

## 2025-03-18 DIAGNOSIS — M25.512 CHRONIC PAIN OF BOTH SHOULDERS: ICD-10-CM

## 2025-03-18 DIAGNOSIS — E78.2 MIXED HYPERLIPIDEMIA: ICD-10-CM

## 2025-03-18 DIAGNOSIS — F10.21 ALCOHOL DEPENDENCE, IN REMISSION (HCC): ICD-10-CM

## 2025-03-18 DIAGNOSIS — M25.511 CHRONIC PAIN OF BOTH SHOULDERS: ICD-10-CM

## 2025-03-18 DIAGNOSIS — K21.9 GASTROESOPHAGEAL REFLUX DISEASE: ICD-10-CM

## 2025-03-18 PROCEDURE — 99214 OFFICE O/P EST MOD 30 MIN: CPT

## 2025-03-18 PROCEDURE — G2211 COMPLEX E/M VISIT ADD ON: HCPCS

## 2025-03-18 RX ORDER — METHYLPREDNISOLONE 4 MG/1
TABLET ORAL
Qty: 21 EACH | Refills: 0 | Status: SHIPPED | OUTPATIENT
Start: 2025-03-18

## 2025-03-18 RX ORDER — ESCITALOPRAM OXALATE 10 MG/1
10 TABLET ORAL DAILY
Qty: 30 TABLET | Refills: 5 | Status: SHIPPED | OUTPATIENT
Start: 2025-03-18 | End: 2025-09-14

## 2025-03-18 RX ORDER — OMEPRAZOLE 40 MG/1
40 CAPSULE, DELAYED RELEASE ORAL EVERY MORNING
Qty: 90 CAPSULE | Refills: 1 | Status: SHIPPED | OUTPATIENT
Start: 2025-03-18

## 2025-03-18 NOTE — ASSESSMENT & PLAN NOTE
Continue omeprazole.  Doing well on this.  No red flag signs/symptoms today.  Contact office if any worsening.  Orders:    omeprazole (PriLOSEC) 40 MG capsule; Take 1 capsule (40 mg total) by mouth every morning

## 2025-03-18 NOTE — ASSESSMENT & PLAN NOTE
Currently about 2 months sober.  Was doing well for about 10 months, then started drinking about 1 handle a week for April 2024 to January 2025.  Started again after her  passed away.  Does have history of MDD, PTSD.  Encouraged continued cessation.  Offered medication, or referral to substance abuse specialist.  Patient would like to hold off today.  Contact office if you would like to complete this.  Continue follow-up with psychology.

## 2025-03-18 NOTE — PROGRESS NOTES
Name: Jessica Ash      : 1965      MRN: [id number]  Encounter Provider: Jorge Zhang PA-C  Encounter Date: 3/18/2025   Encounter department: [facility]  :  Assessment & Plan  Multiple sclerosis (HCC)  Continue follow-up and plan per neurology.       Age-related osteoporosis without current pathological fracture  Will refer to endocrinology today to discuss treatment options and management.  Does have strong family history of this.  Educated on fall prevention/risk.  Orders:    Ambulatory Referral to Endocrinology; Future    Alcohol dependence, in remission (HCC)  Currently about 2 months sober.  Was doing well for about 10 months, then started drinking about 1 handle a week for 2024 to 2025.  Started again after her  passed away.  Does have history of MDD, PTSD.  Encouraged continued cessation.  Offered medication, or referral to substance abuse specialist.  Patient would like to hold off today.  Contact office if you would like to complete this.  Continue follow-up with psychology.       Moderate episode of recurrent major depressive disorder (HCC)  Depression Screening Follow-up Plan: Patient's depression screening was positive with a PHQ-9 score of 17. Patient assessed for underlying major depression. They have no active suicidal ideations. Brief counseling provided and recommend additional follow-up/re-evaluation next office visit.  Will prescribe Lexapro 10 mg daily.  Educated patient on side effects of medication.  Will follow-up in 1 month with symptom progression.  Denies SI/HI.  Continue follow-up with psychology.  Educated importance of alcohol cessation while on this medication.  If this becomes an issue, please contact office and stop medication.  Orders:    escitalopram (LEXAPRO) 10 mg tablet; Take 1 tablet (10 mg total) by mouth daily    Recurrent depression (HCC)  Depression Screening Follow-up Plan: Patient's depression screening was positive  with a PHQ-9 score of 17. Patient assessed for underlying major depression. They have no active suicidal ideations. Brief counseling provided and recommend additional follow-up/re-evaluation next office visit.  See above  Orders:    escitalopram (LEXAPRO) 10 mg tablet; Take 1 tablet (10 mg total) by mouth daily    Mixed hyperlipidemia  ASCVD risk score 2.6%.  Will continue to monitor this.  Educated on healthy diet and activity.       BMI 26.0-26.9,adult  Educated on healthy diet and activity.       Gastroesophageal reflux disease  Continue omeprazole.  Doing well on this.  No red flag signs/symptoms today.  Contact office if any worsening.  Orders:    omeprazole (PriLOSEC) 40 MG capsule; Take 1 capsule (40 mg total) by mouth every morning    Chronic pain of both shoulders  Will prescribe Medrol Dosepak.  Will follow-up pending imaging results.  Orders:    XR shoulder 2+ vw right; Future    XR shoulder 2+ vw left; Future    XR spine cervical complete 4 or 5 vw non injury; Future    methylPREDNISolone 4 MG tablet therapy pack; Use as directed on package    Elevated hemoglobin (HCC)  History of AUGIE without using CPAP.  Could very well be because of this.  Will continue to monitor.  Educated on signs/symptoms of polycythemia and is to contact office if these present.  If continued worsening, or no improvement, will refer to hematology.       Screen for colon cancer    Orders:    Ambulatory Referral to Gastroenterology; Future    Vitamin D deficiency    Orders:    Vitamin D 25 hydroxy; Future    Vitamin B12 deficiency    Orders:    Vitamin B12; Future           History of Present Illness   Patient is a 59-year-old female presenting for follow-up.  Has bilateral shoulder pain.  Worse on range of motion or weightbearing.  Denies any trauma to the areas.  Denies numbness, tingling, shooting down the arms, headaches, decreased range of motion.  Patient also is having worsening depression and PTSD symptoms.  Does have  history of this.   passed away in April 2024.  Was 10 months sober of alcohol prior to this, but then started drinking up until January 2025.  Was having 1 handle week.  Would drink every night.  About 2 drinks a night.  Has been sober for about 2 months at this point.  Would like to try medication to help with mood, as she believes this is a large contributing factor to alcohol dependence.      Review of Systems   Constitutional:  Negative for appetite change, chills, diaphoresis, fatigue and fever.   HENT:  Negative for congestion, ear discharge, ear pain, postnasal drip, rhinorrhea, sinus pressure, sinus pain, sneezing and sore throat.    Eyes:  Negative for pain, discharge, redness, itching and visual disturbance.   Respiratory:  Negative for apnea, cough, chest tightness, shortness of breath and wheezing.    Cardiovascular:  Negative for chest pain, palpitations and leg swelling.   Gastrointestinal:  Negative for abdominal pain, blood in stool, constipation, diarrhea, nausea and vomiting.   Endocrine: Negative for cold intolerance, heat intolerance, polydipsia and polyuria.   Genitourinary:  Negative for dysuria, flank pain, frequency, hematuria and urgency.   Musculoskeletal:  Positive for arthralgias. Negative for back pain, myalgias, neck pain and neck stiffness.   Skin:  Negative for color change and rash.   Allergic/Immunologic: Negative for environmental allergies and food allergies.   Neurological:  Negative for dizziness, tremors, seizures, syncope, speech difficulty, weakness, light-headedness, numbness and headaches.   Hematological:  Negative for adenopathy. Does not bruise/bleed easily.   Psychiatric/Behavioral:  Positive for dysphoric mood. Negative for agitation, confusion, decreased concentration, hallucinations, self-injury, sleep disturbance and suicidal ideas. The patient is not nervous/anxious and is not hyperactive.    All other systems reviewed and are negative.      Objective   BP  104/70 (BP Location: Left arm, Patient Position: Sitting)   Pulse 72   Temp 98.2 °F (36.8 °C) (Tympanic)   Wt 61.2 kg (135 lb)   SpO2 98%   BMI 26.37 kg/m²      Physical Exam  Vitals and nursing note reviewed.   Constitutional:       General: She is not in acute distress.     Appearance: Normal appearance. She is well-developed and normal weight. She is not ill-appearing, toxic-appearing or diaphoretic.   HENT:      Head: Normocephalic and atraumatic.      Right Ear: Tympanic membrane normal.      Left Ear: Tympanic membrane normal.      Nose: Nose normal.      Mouth/Throat:      Mouth: Mucous membranes are moist.      Pharynx: Oropharynx is clear.   Eyes:      Conjunctiva/sclera: Conjunctivae normal.      Pupils: Pupils are equal, round, and reactive to light.   Neck:      Vascular: No carotid bruit.   Cardiovascular:      Rate and Rhythm: Normal rate and regular rhythm.      Pulses: Normal pulses.      Heart sounds: Normal heart sounds. No murmur heard.  Pulmonary:      Effort: Pulmonary effort is normal. No respiratory distress.      Breath sounds: Normal breath sounds. No wheezing.   Chest:      Chest wall: No tenderness.   Abdominal:      General: Bowel sounds are normal.      Palpations: Abdomen is soft. There is no mass.      Tenderness: There is no abdominal tenderness.   Musculoskeletal:         General: No swelling or tenderness. Normal range of motion.      Cervical back: Normal range of motion and neck supple. No tenderness.      Right lower leg: No edema.      Left lower leg: No edema.   Lymphadenopathy:      Cervical: No cervical adenopathy.   Skin:     General: Skin is warm and dry.      Capillary Refill: Capillary refill takes less than 2 seconds.      Findings: No erythema, lesion or rash.   Neurological:      General: No focal deficit present.      Mental Status: She is alert and oriented to person, place, and time. Mental status is at baseline.      Motor: No weakness.      Coordination:  Coordination normal.      Gait: Gait normal.   Psychiatric:         Mood and Affect: Mood normal.         Behavior: Behavior normal.         Thought Content: Thought content normal.         Judgment: Judgment normal.

## 2025-03-18 NOTE — ASSESSMENT & PLAN NOTE
Depression Screening Follow-up Plan: Patient's depression screening was positive with a PHQ-9 score of 17. Patient assessed for underlying major depression. They have no active suicidal ideations. Brief counseling provided and recommend additional follow-up/re-evaluation next office visit.  See above  Orders:    escitalopram (LEXAPRO) 10 mg tablet; Take 1 tablet (10 mg total) by mouth daily

## 2025-03-18 NOTE — ASSESSMENT & PLAN NOTE
Depression Screening Follow-up Plan: Patient's depression screening was positive with a PHQ-9 score of 17. Patient assessed for underlying major depression. They have no active suicidal ideations. Brief counseling provided and recommend additional follow-up/re-evaluation next office visit.  Will prescribe Lexapro 10 mg daily.  Educated patient on side effects of medication.  Will follow-up in 1 month with symptom progression.  Denies SI/HI.  Continue follow-up with psychology.  Educated importance of alcohol cessation while on this medication.  If this becomes an issue, please contact office and stop medication.  Orders:    escitalopram (LEXAPRO) 10 mg tablet; Take 1 tablet (10 mg total) by mouth daily

## 2025-03-18 NOTE — ASSESSMENT & PLAN NOTE
ASCVD risk score 2.6%.  Will continue to monitor this.  Educated on healthy diet and activity.

## 2025-03-18 NOTE — ASSESSMENT & PLAN NOTE
Will prescribe Medrol Dosepak.  Will follow-up pending imaging results.  Orders:    XR shoulder 2+ vw right; Future    XR shoulder 2+ vw left; Future    XR spine cervical complete 4 or 5 vw non injury; Future    methylPREDNISolone 4 MG tablet therapy pack; Use as directed on package

## 2025-03-18 NOTE — ASSESSMENT & PLAN NOTE
Will refer to endocrinology today to discuss treatment options and management.  Does have strong family history of this.  Educated on fall prevention/risk.  Orders:    Ambulatory Referral to Endocrinology; Future

## 2025-03-26 ENCOUNTER — VBI (OUTPATIENT)
Dept: ADMINISTRATIVE | Facility: OTHER | Age: 60
End: 2025-03-26

## 2025-03-26 NOTE — TELEPHONE ENCOUNTER
03/26/25 9:52 AM     Chart reviewed for CRC: Colonoscopy was/were not submitted to the patient's insurance.     Ana Warren MA   [facility]

## 2025-04-11 ENCOUNTER — TELEPHONE (OUTPATIENT)
Dept: FAMILY MEDICINE CLINIC | Facility: CLINIC | Age: 60
End: 2025-04-11

## 2025-04-11 NOTE — TELEPHONE ENCOUNTER
Left vm for pt to call the office and reschedule her 1 month f/u with Jorge. If pt returns call, please assist with scheduling.

## 2025-04-23 ENCOUNTER — CONSULT (OUTPATIENT)
Dept: ENDOCRINOLOGY | Facility: CLINIC | Age: 60
End: 2025-04-23
Payer: COMMERCIAL

## 2025-04-23 VITALS
SYSTOLIC BLOOD PRESSURE: 120 MMHG | BODY MASS INDEX: 26.9 KG/M2 | WEIGHT: 137 LBS | OXYGEN SATURATION: 98 % | TEMPERATURE: 98 F | HEIGHT: 60 IN | HEART RATE: 67 BPM | DIASTOLIC BLOOD PRESSURE: 82 MMHG

## 2025-04-23 DIAGNOSIS — M81.6 LOCALIZED OSTEOPOROSIS, UNSPECIFIED PATHOLOGICAL FRACTURE PRESENCE: ICD-10-CM

## 2025-04-23 DIAGNOSIS — M81.0 AGE-RELATED OSTEOPOROSIS WITHOUT CURRENT PATHOLOGICAL FRACTURE: Primary | ICD-10-CM

## 2025-04-23 DIAGNOSIS — Z87.39 HISTORY OF OSTEOPOROSIS: ICD-10-CM

## 2025-04-23 PROCEDURE — 99204 OFFICE O/P NEW MOD 45 MIN: CPT | Performed by: STUDENT IN AN ORGANIZED HEALTH CARE EDUCATION/TRAINING PROGRAM

## 2025-04-23 NOTE — PATIENT INSTRUCTIONS
Osteoporosis Education   Osteoporosis  is a long-term medical condition that causes your bones to become weak, brittle, and more likely to fracture. Osteoporosis occurs when your body absorbs more bone than it makes. It is also caused by a lack of calcium and estrogen (female hormone).  Common symptoms include the following:  You may not have any signs or symptoms. You may break a bone after a muscle strain, bump, or fall. A break usually occurs in the hip, spine, or wrist. A collapsed vertebra (bone in your spine) may cause severe back pain or loss of height from bent posture.  Call your doctor if:    You have severe pain.   You have increasing pain after a fall.   You have pain when you do your daily activities.   You have questions or concerns about your condition or care.  Diagnosis of osteoporosis:   Blood and urine tests  measure your calcium, vitamin D, and estrogen levels.    An x-ray or CT may show thinned bones or a fracture. You may be given contrast liquid to help the bones show up better in the pictures. Tell the healthcare provider if you have ever had an allergic reaction to contrast liquid. Do not enter the MRI room with anything metal. Metal can cause serious injury. Tell the healthcare provider if you have any metal in or on your body.    A bone density test  compares your bone thickness with what is expected for someone of your age, gender, and ethnicity.  Treatment for osteoporosis may include medicines to prevent bone loss, build new bone, and increase estrogen. These medicines help prevent fractures and may be given as a pill or injection. Ask your healthcare provider for more information on these medicines.  Prevent bone loss:  Eat healthy foods that are high in calcium.  This helps keep your bones strong. Good sources of calcium are milk, cheese, broccoli, tofu, almonds, and canned salmon and sardines. Recommended to get at least 1200mg daily of calcium.  Increase your vitamin D intake.   Vitamin D is in fish oils, some vegetables, and fortified milk, cereal, and bread. Vitamin D is also formed in the skin when it is exposed to the sun. Ask your healthcare provider how much sunlight is safe for you. You will require at least 800 units of vitamin D daily taken as a supplement.  Drink liquids as directed.  Ask your healthcare provider how much liquid to drink each day and which liquids are best for you. Do not have alcohol or caffeine. They decrease bone mineral density, which can weaken your bones.  Exercise regularly.  Ask your healthcare provider about the best exercise plan for you. Weight bearing exercise for 30 minutes, 3 times a week can help build and strengthen bone.  Do not smoke.  Nicotine and other chemicals in cigarettes and cigars can cause lung damage. Ask your healthcare provider for information if you currently smoke and need help to quit. E-cigarettes or smokeless tobacco still contain nicotine. Talk to your healthcare provider before you use these products.  Go to physical therapy as directed.  A physical therapist teaches you exercises to help improve movement and muscle strength.  Alcohol. It is recommended to avoid heavy alcohol use as increased consumption of alcohol is known to cause bone loss  © Copyright [employer] 2021 Information is for End User's use only and may not be sold, redistributed or otherwise used for commercial purposes. All illustrations and images included in CareNotes® are the copyrighted property of [employer] or [employer]

## 2025-04-23 NOTE — ASSESSMENT & PLAN NOTE
Osteoporosis. Bone density test results indicate significant osteoporosis, particularly in the right hip, with a T-score below -3. Currently taking calcium citrate and vitamin D supplements. Comprehensive discussion held regarding the importance of nutrition, calcium, and vitamin D intake for bone health. The potential impact of omeprazole on calcium absorption was also discussed. Laboratory tests will be ordered to assess vitamin D levels and other relevant parameters. Various treatment options, including medications that delay bone loss progression and those that enhance bone gain, were explored. Sequential therapy involving injections was also discussed. Given severity of osteoporosis at hip, a sequential therapy approach may be preferred. A follow-up appointment will be scheduled in approximately 1 month to discuss lab results and finalize the treatment plan.    Follow-up: The patient will follow up in 1 month.    Orders:  •  Ambulatory Referral to Endocrinology  •  Comprehensive metabolic panel (CMP); Future  •  Vitamin D 25 hydroxy; Future  •  CBC and Platelet; Future  •  TSH, 3rd generation with Free T4 reflex; Future  •  Phosphorus; Future  •  PTH, intact; Future

## 2025-04-23 NOTE — PROGRESS NOTES
Name: Jessica Ash      : 1965      MRN: [id number]  Encounter Provider: Onesimo Craven,   Encounter Date: 2025   Encounter department: [facility]    No chief complaint on file.  :  Assessment & Plan  Age-related osteoporosis without current pathological fracture  Osteoporosis. Bone density test results indicate significant osteoporosis, particularly in the right hip, with a T-score below -3. Currently taking calcium citrate and vitamin D supplements. Comprehensive discussion held regarding the importance of nutrition, calcium, and vitamin D intake for bone health. The potential impact of omeprazole on calcium absorption was also discussed. Laboratory tests will be ordered to assess vitamin D levels and other relevant parameters. Various treatment options, including medications that delay bone loss progression and those that enhance bone gain, were explored. Sequential therapy involving injections was also discussed. Given severity of osteoporosis at hip, a sequential therapy approach may be preferred. A follow-up appointment will be scheduled in approximately 1 month to discuss lab results and finalize the treatment plan.    Follow-up: The patient will follow up in 1 month.    Orders:  •  Ambulatory Referral to Endocrinology  •  Comprehensive metabolic panel (CMP); Future  •  Vitamin D 25 hydroxy; Future  •  CBC and Platelet; Future  •  TSH, 3rd generation with Free T4 reflex; Future  •  Phosphorus; Future  •  PTH, intact; Future    History of osteoporosis    Orders:  •  CBC and Platelet; Future    Localized osteoporosis, unspecified pathological fracture presence    Orders:  •  TSH, 3rd generation with Free T4 reflex; Future        History of Present Illness   History of Present Illness  The patient is a 59-year-old female who presents for evaluation of osteoporosis.    She was referred by her primary care physician, Dr. Patel, following a recent  bone density test that revealed osteoporosis. A previous bone density test conducted in 2022 had indicated osteopenia. No history of fractures is reported. She has been supplementing with calcium citrate and vitamin D for several years due to long-term omeprazole therapy for acid reflux. Vitamin D levels have not yet been checked. There is a history of falls related to multiple sclerosis (MS), but no fractures have occurred. Dental history includes a recent cavity filling and a screw in place for a future dental implant.    She has a diagnosis of MS, for which she was previously on glatiramer acetate but has since discontinued the medication without adverse effects. Intravenous steroids were administered for MS exacerbations in the past.    She reports no dietary restrictions or intolerances. There is a family history of severe osteoporosis in her mother, who also has a high blood clot risk and diabetes.    PAST SURGICAL HISTORY: She has undergone 2 C-sections, sinus surgery, and a hysterectomy due to fibroid tumors. She retains her ovaries and experienced early onset hot flashes around the age of 33 to 38. No history of kidney stones is reported. Pain medication is used as needed.    SOCIAL HISTORY  She has 2 children and 4 grandchildren.    FAMILY HISTORY  Her mother has severe osteoporosis, diabetes, and had a heart valve replacement.    Pertinent Medical History            Review of Systems as per HPI       Medical History Reviewed by provider this encounter:  Tobacco  Allergies  Meds  Problems  Med Hx  Surg Hx  Fam Hx     .    Objective   /82 (BP Location: Left arm, Patient Position: Sitting)   Pulse 67   Temp 98 °F (36.7 °C)   Ht 5' (1.524 m)   Wt 62.1 kg (137 lb)   SpO2 98%   BMI 26.76 kg/m²      Body mass index is 26.76 kg/m².  Wt Readings from Last 3 Encounters:   04/23/25 62.1 kg (137 lb)   03/18/25 61.2 kg (135 lb)   09/17/24 64.6 kg (142 lb 6.4 oz)     Physical Exam  Vitals reviewed.  "  Constitutional:       General: She is not in acute distress.     Appearance: Normal appearance.   HENT:      Head: Normocephalic and atraumatic.   Eyes:      General: No scleral icterus.     Conjunctiva/sclera: Conjunctivae normal.   Pulmonary:      Effort: Pulmonary effort is normal. No respiratory distress.   Musculoskeletal:         General: No deformity.      Cervical back: Normal range of motion.   Neurological:      General: No focal deficit present.      Mental Status: She is alert.   Psychiatric:         Mood and Affect: Mood normal.         Behavior: Behavior normal.       Physical Exam  Patient appears well, no acute distress.    Results  Imaging:  Bone density testing showed an osteoporotic T-score at the right hip and left hip.  Labs:   Lab Results   Component Value Date    HGBA1C 5.6 03/04/2024    HGBA1C 5.3 01/13/2020     Lab Results   Component Value Date    CREATININE 0.82 02/25/2025    CREATININE 0.83 03/04/2024    CREATININE 0.83 03/04/2024    BUN 10 02/25/2025     01/21/2014    K 4.7 02/25/2025     02/25/2025    CO2 30 02/25/2025     eGFRcr   Date Value Ref Range Status   03/04/2024 80 >59 Final     eGFR   Date Value Ref Range Status   02/25/2025 81 >59 Final     Lab Results   Component Value Date    CHOL 187 01/21/2014    HDL 60 02/25/2025    TRIG 205 (H) 02/25/2025     Lab Results   Component Value Date    ALT 20 02/25/2025    AST 17 02/25/2025    ALKPHOS 60 02/25/2025    BILITOT 0.9 01/21/2014     Lab Results   Component Value Date    QQP3JLIAYYFV 0.712 01/21/2014     No results found for: \"FREET4\", \"TSI\"    2.19.25    DEXA scan     Indication: Evaluate for osteoporosis. 59-year-old female.     Technique:   A DEXA scan was performed using a [employer] Advance   densitometer. Assessment for L1-L4 and both hips.       Comparison: 12/29/2022.       Findings:  Similar findings as on prior exam 12/2022.     Again there is an osteoporotic T score at the right hip neck. And today " there is   also a threshold osteoporotic T score at the left hip total.     The other 2 hip T-scores are osteopenic. For the 4 measured lumbar areas there   remain 2 osteopenic T-scores and 2 unremarkable T-scores.     Consequently today there are 2 osteoporotic T-scores and previously one.   Consequently there remains evidence of osteoporosis, today with 2 T-scores below   -2.5 suggesting severe increased risk of fracture.       Specifically for the hips:   The left hip shows threshold osteoporotic T-score   for the hip-Total, -2.5.    And for the left hip-Neck osteopenic -2.4.     And for the contralateral right hip, osteoporotic T score for the hip-Neck,   -3.1. And osteopenic for the hip-Total, -2.4. (Comparing the combined bone   mineral densities to the prior exam there is -2.2% worsening.)     For the lumbar spine the L1 and L2 vertebra both show osteopenic T-scores, both   -2.0. And the L3 and L4 vertebra show unremarkable T-scores: -0.7 and -0.2.    (Comparing the combined bone mineral densities to the prior exam there is +2.0%   improvement.)     The FRAX estimate for 10 year probability of fracture:   Major osteoporotic   fracture: 16.2 %.     Hip fracture: 4.9 %.       The actual bone mineral densities were as follows for the Total lumbar, Total   right hip and Total left hip: 1.07, 0.703 and 0.693 gram/centimeter-squared.     Patient Instructions   Osteoporosis Education   Osteoporosis  is a long-term medical condition that causes your bones to become weak, brittle, and more likely to fracture. Osteoporosis occurs when your body absorbs more bone than it makes. It is also caused by a lack of calcium and estrogen (female hormone).  Common symptoms include the following:  You may not have any signs or symptoms. You may break a bone after a muscle strain, bump, or fall. A break usually occurs in the hip, spine, or wrist. A collapsed vertebra (bone in your spine) may cause severe back pain or loss of height  from bent posture.  Call your doctor if:    You have severe pain.   You have increasing pain after a fall.   You have pain when you do your daily activities.   You have questions or concerns about your condition or care.  Diagnosis of osteoporosis:   Blood and urine tests  measure your calcium, vitamin D, and estrogen levels.    An x-ray or CT may show thinned bones or a fracture. You may be given contrast liquid to help the bones show up better in the pictures. Tell the healthcare provider if you have ever had an allergic reaction to contrast liquid. Do not enter the MRI room with anything metal. Metal can cause serious injury. Tell the healthcare provider if you have any metal in or on your body.    A bone density test  compares your bone thickness with what is expected for someone of your age, gender, and ethnicity.  Treatment for osteoporosis may include medicines to prevent bone loss, build new bone, and increase estrogen. These medicines help prevent fractures and may be given as a pill or injection. Ask your healthcare provider for more information on these medicines.  Prevent bone loss:  Eat healthy foods that are high in calcium.  This helps keep your bones strong. Good sources of calcium are milk, cheese, broccoli, tofu, almonds, and canned salmon and sardines. Recommended to get at least 1200mg daily of calcium.  Increase your vitamin D intake.  Vitamin D is in fish oils, some vegetables, and fortified milk, cereal, and bread. Vitamin D is also formed in the skin when it is exposed to the sun. Ask your healthcare provider how much sunlight is safe for you. You will require at least 800 units of vitamin D daily taken as a supplement.  Drink liquids as directed.  Ask your healthcare provider how much liquid to drink each day and which liquids are best for you. Do not have alcohol or caffeine. They decrease bone mineral density, which can weaken your bones.  Exercise regularly.  Ask your healthcare provider  about the best exercise plan for you. Weight bearing exercise for 30 minutes, 3 times a week can help build and strengthen bone.  Do not smoke.  Nicotine and other chemicals in cigarettes and cigars can cause lung damage. Ask your healthcare provider for information if you currently smoke and need help to quit. E-cigarettes or smokeless tobacco still contain nicotine. Talk to your healthcare provider before you use these products.  Go to physical therapy as directed.  A physical therapist teaches you exercises to help improve movement and muscle strength.  Alcohol. It is recommended to avoid heavy alcohol use as increased consumption of alcohol is known to cause bone loss  © Copyright [employer] 2021 Information is for End User's use only and may not be sold, redistributed or otherwise used for commercial purposes. All illustrations and images included in CareNotes® are the copyrighted property of [employer] or [employer]       Discussed with the patient and all questioned fully answered. She will call me if any problems arise.

## 2025-04-25 NOTE — TELEPHONE ENCOUNTER
Upon review of the In Basket request we were able to locate, review, and update the patient chart as requested for Pap Smear (HPV) aka Cervical Cancer Screening  Any additional questions or concerns should be emailed to the Practice Liaisons via the appropriate education email address, please do not reply via In Basket      Thank you  Jazlyn Portillo no

## 2025-05-07 ENCOUNTER — TELEPHONE (OUTPATIENT)
Dept: FAMILY MEDICINE CLINIC | Facility: CLINIC | Age: 60
End: 2025-05-07

## 2025-05-22 ENCOUNTER — TELEPHONE (OUTPATIENT)
Age: 60
End: 2025-05-22

## 2025-05-22 LAB
25(OH)D3+25(OH)D2 SERPL-MCNC: 65 NG/ML (ref 30–100)
ALBUMIN SERPL-MCNC: 4.3 G/DL (ref 3.5–5.7)
ALP SERPL-CCNC: 58 U/L (ref 35–120)
ALT SERPL-CCNC: 17 U/L
ANION GAP SERPL CALCULATED.3IONS-SCNC: 7 MMOL/L (ref 3–11)
AST SERPL-CCNC: 17 U/L
BILIRUB SERPL-MCNC: 1.4 MG/DL (ref 0.2–1)
BUN SERPL-MCNC: 11 MG/DL (ref 7–25)
CALCIUM SERPL-MCNC: 9.6 MG/DL (ref 8.5–10.5)
CHLORIDE SERPL-SCNC: 102 MMOL/L (ref 100–109)
CO2 SERPL-SCNC: 30 MMOL/L (ref 21–31)
CREAT SERPL-MCNC: 0.83 MG/DL (ref 0.4–1.1)
CYTOLOGY CMNT CVX/VAG CYTO-IMP: ABNORMAL
ERYTHROCYTE [DISTWIDTH] IN BLOOD BY AUTOMATED COUNT: 13.7 % (ref 12–16)
GFR/BSA.PRED SERPLBLD CYS-BASED-ARV: 80 ML/MIN/{1.73_M2}
GLUCOSE SERPL-MCNC: 93 MG/DL (ref 65–99)
HCT VFR BLD AUTO: 43 % (ref 35–43)
HGB BLD-MCNC: 14.7 G/DL (ref 11.5–14.5)
MCH RBC QN AUTO: 31.1 PG (ref 26–34)
MCHC RBC AUTO-ENTMCNC: 34.2 G/DL (ref 32–37)
MCV RBC AUTO: 91 FL (ref 80–100)
PHOSPHATE SERPL-MCNC: 3.7 MG/DL (ref 2.3–4.6)
PLATELET # BLD AUTO: 343 THOU/CMM (ref 140–350)
PMV BLD REES-ECKER: 7.7 FL (ref 7.5–11.3)
POTASSIUM SERPL-SCNC: 4.2 MMOL/L (ref 3.5–5.2)
PROT SERPL-MCNC: 6.8 G/DL (ref 6.3–8.3)
PTH-INTACT SERPL-MCNC: 57.2 PG/ML (ref 12–88)
RBC # BLD AUTO: 4.74 MILL/CMM (ref 3.7–4.7)
SODIUM SERPL-SCNC: 139 MMOL/L (ref 135–145)
TSH SERPL-ACNC: 1.05 UIU/ML (ref 0.45–5.33)
VIT B12 SERPL-MCNC: 734 PG/ML (ref 180–914)
WBC # BLD AUTO: 9.1 THOU/CMM (ref 4–10)

## 2025-05-23 ENCOUNTER — RESULTS FOLLOW-UP (OUTPATIENT)
Dept: FAMILY MEDICINE CLINIC | Facility: CLINIC | Age: 60
End: 2025-05-23

## 2025-05-23 ENCOUNTER — RESULTS FOLLOW-UP (OUTPATIENT)
Dept: ENDOCRINOLOGY | Facility: CLINIC | Age: 60
End: 2025-05-23

## 2025-07-01 ENCOUNTER — TELEPHONE (OUTPATIENT)
Age: 60
End: 2025-07-01

## 2025-07-01 ENCOUNTER — OFFICE VISIT (OUTPATIENT)
Dept: ENDOCRINOLOGY | Facility: CLINIC | Age: 60
End: 2025-07-01
Payer: COMMERCIAL

## 2025-07-01 VITALS
HEART RATE: 65 BPM | WEIGHT: 137 LBS | SYSTOLIC BLOOD PRESSURE: 112 MMHG | HEIGHT: 60 IN | TEMPERATURE: 92.2 F | BODY MASS INDEX: 26.9 KG/M2 | DIASTOLIC BLOOD PRESSURE: 70 MMHG

## 2025-07-01 DIAGNOSIS — Z91.81 AT RISK FOR FALLS: ICD-10-CM

## 2025-07-01 DIAGNOSIS — M81.0 AGE-RELATED OSTEOPOROSIS WITHOUT CURRENT PATHOLOGICAL FRACTURE: Primary | ICD-10-CM

## 2025-07-01 DIAGNOSIS — G35 MULTIPLE SCLEROSIS (HCC): ICD-10-CM

## 2025-07-01 PROCEDURE — G2211 COMPLEX E/M VISIT ADD ON: HCPCS | Performed by: STUDENT IN AN ORGANIZED HEALTH CARE EDUCATION/TRAINING PROGRAM

## 2025-07-01 PROCEDURE — 99214 OFFICE O/P EST MOD 30 MIN: CPT | Performed by: STUDENT IN AN ORGANIZED HEALTH CARE EDUCATION/TRAINING PROGRAM

## 2025-07-01 NOTE — PROGRESS NOTES
Name: Jessica Ash      : 1965      MRN: [id number]  Encounter Provider: Onesimo Craven DO  Encounter Date: 2025   Encounter department: [facility]    No chief complaint on file.  :  Assessment & Plan  Age-related osteoporosis without current pathological fracture  Osteoporosis: Severe.  The patient has osteoporosis classified as severe based on the DEXA scan results, particularly affecting the hip where her T score is < -3.0. She has no fracture history but is elevated fracture risk given history of neuromuscular disorder (multiple sclerosis) and visual impairment The metabolic panel is within normal limits, with a vitamin D level of 65, parathyroid hormone level of 57.2, and serum calcium of 9.6. Two treatment options were discussed: daily parathyroid hormone injections for two years or monthly Evenity injections for one year. The potential side effects of these treatments, including injection site pain and orthostasis, were explained. As per mutual decision making, we will proceed with PTH analog therapy. A prescription for daily parathyroid hormone (eg tymlos) injections will be sent to specialty pharmacy, pending prior authorization. Her labs will be monitored within a month of starting the treatment to ensure her calcium levels remain stable. Will plan 12-month treatment course followed by transition to anti-resorptive (eg Reclast) for an additional 3-5 years of treatment with annual assessment for fracture risk, including monitoring of DXA.     Follow-up: A follow-up visit is scheduled in 6 months.    Orders:  •  Comprehensive metabolic panel; Future  •  C-Telopeptide; Future  •  Abaloparatide 3120 MCG/1.56ML SOPN; Inject 80 mcg under the skin daily    Multiple sclerosis (HCC)         At risk for falls  On account of fall history, multiple sclerosis and history of visual impairment, this patient is at high risk for future falls              Pertinent Medical History         History of Present Illness   History of Present Illness  The patient is a 59-year-old female here today for a follow-up of osteoporosis. She was initially seen in consultation on 04/23/2025 for newly diagnosed osteoporosis, which was identified by DEXA. She has no history of fractures. She is on chronic proton pump inhibitor and takes calcium citrate supplementation along with vitamin D, a regimen she has maintained for several years.    She has a history of falls related to multiple sclerosis and has prior exposure to IV pulse steroids during exacerbations of her condition. There are no dietary restrictions or intolerances. She has no history of kidney stones or radiation to bone. She has a recent dental history, including a screw in place for a dental implant.    There is a family history of severe osteoporosis in her mother, who also has a high blood clot risk and diabetes. She reports no new health issues since her last visit. She has a slight foot drop and recently fell off a chair but did not sustain any fractures. She is currently not on any medication for her multiple sclerosis as she feels well. She has a missing tooth and a screw in place for a dental implant but has not yet received the implant. She is not expecting any other dental procedures in the near future. She has been tested for factor V Leiden and does not have it. She recalls experiencing orthostatic hypotension as a child due to low blood pressure, but this has not been an issue for several years.    FAMILY HISTORY  There is a family history of severe osteoporosis in her mother, who also has a high blood clot risk and diabetes.    Review of Systems as per HPI       Medical History Reviewed by provider this encounter:  Tobacco  Allergies  Meds  Problems  Med Hx  Surg Hx  Fam Hx     .    Objective   /70 (Patient Position: Sitting, Cuff Size: Standard)   Pulse 65   Temp (!) 92.2 °F (33.4  °C) (Temporal)   Ht 5' (1.524 m)   Wt 62.1 kg (137 lb)   BMI 26.76 kg/m²      Body mass index is 26.76 kg/m².  Wt Readings from Last 3 Encounters:   07/01/25 62.1 kg (137 lb)   04/23/25 62.1 kg (137 lb)   03/18/25 61.2 kg (135 lb)     Physical Exam  Vitals reviewed.   Constitutional:       General: She is not in acute distress.     Appearance: Normal appearance.   HENT:      Head: Normocephalic and atraumatic.     Eyes:      General: No scleral icterus.     Conjunctiva/sclera: Conjunctivae normal.      Comments: Wears eye patch over right eye   Pulmonary:      Effort: Pulmonary effort is normal. No respiratory distress.     Musculoskeletal:         General: No deformity.      Cervical back: Normal range of motion.     Neurological:      General: No focal deficit present.      Mental Status: She is alert.     Psychiatric:         Mood and Affect: Mood normal.         Behavior: Behavior normal.       Results  Labs:  Serum calcium is 9.6. Serum albumin 4.3. Alkaline phosphatase 58. EGFR is 80. Hemoglobin is 14.7. RBC count of 4.74. Vitamin D 25 hydroxy is 65. Parathyroid hormone level is 57.2. TSH is 1.05. Phosphorus is 3.7.    Imaging:  DEXA scan showed a T-score of -3.1 at the right hip neck.  Labs: I have reviewed pertinent labs including:     Patient Instructions   Osteoporosis Education   Osteoporosis  is a long-term medical condition that causes your bones to become weak, brittle, and more likely to fracture. Osteoporosis occurs when your body absorbs more bone than it makes. It is also caused by a lack of calcium and estrogen (female hormone).  Common symptoms include the following:  You may not have any signs or symptoms. You may break a bone after a muscle strain, bump, or fall. A break usually occurs in the hip, spine, or wrist. A collapsed vertebra (bone in your spine) may cause severe back pain or loss of height from bent posture.  Call your doctor if:    You have severe pain.   You have increasing pain  after a fall.   You have pain when you do your daily activities.   You have questions or concerns about your condition or care.  Diagnosis of osteoporosis:   Blood and urine tests  measure your calcium, vitamin D, and estrogen levels.    An x-ray or CT may show thinned bones or a fracture. You may be given contrast liquid to help the bones show up better in the pictures. Tell the healthcare provider if you have ever had an allergic reaction to contrast liquid. Do not enter the MRI room with anything metal. Metal can cause serious injury. Tell the healthcare provider if you have any metal in or on your body.    A bone density test  compares your bone thickness with what is expected for someone of your age, gender, and ethnicity.  Treatment for osteoporosis may include medicines to prevent bone loss, build new bone, and increase estrogen. These medicines help prevent fractures and may be given as a pill or injection. Ask your healthcare provider for more information on these medicines.  Prevent bone loss:  Eat healthy foods that are high in calcium.  This helps keep your bones strong. Good sources of calcium are milk, cheese, broccoli, tofu, almonds, and canned salmon and sardines. Recommended to get at least 1200mg daily of calcium.  Increase your vitamin D intake.  Vitamin D is in fish oils, some vegetables, and fortified milk, cereal, and bread. Vitamin D is also formed in the skin when it is exposed to the sun. Ask your healthcare provider how much sunlight is safe for you. You will require at least 800 units of vitamin D daily taken as a supplement.  Drink liquids as directed.  Ask your healthcare provider how much liquid to drink each day and which liquids are best for you. Do not have alcohol or caffeine. They decrease bone mineral density, which can weaken your bones.  Exercise regularly.  Ask your healthcare provider about the best exercise plan for you. Weight bearing exercise for 30 minutes, 3 times a week  can help build and strengthen bone.  Do not smoke.  Nicotine and other chemicals in cigarettes and cigars can cause lung damage. Ask your healthcare provider for information if you currently smoke and need help to quit. E-cigarettes or smokeless tobacco still contain nicotine. Talk to your healthcare provider before you use these products.  Go to physical therapy as directed.  A physical therapist teaches you exercises to help improve movement and muscle strength.  Alcohol. It is recommended to avoid heavy alcohol use as increased consumption of alcohol is known to cause bone loss  © Copyright [employer] 2021 Information is for End User's use only and may not be sold, redistributed or otherwise used for commercial purposes. All illustrations and images included in CareNotes® are the copyrighted property of [employer] or [employer]       Discussed with the patient and all questioned fully answered. She will call me if any problems arise.

## 2025-07-01 NOTE — ASSESSMENT & PLAN NOTE
Osteoporosis: Severe.  The patient has osteoporosis classified as severe based on the DEXA scan results, particularly affecting the hip where her T score is < -3.0. She has no fracture history but is elevated fracture risk given history of neuromuscular disorder (multiple sclerosis) and visual impairment The metabolic panel is within normal limits, with a vitamin D level of 65, parathyroid hormone level of 57.2, and serum calcium of 9.6. Two treatment options were discussed: daily parathyroid hormone injections for two years or monthly Evenity injections for one year. The potential side effects of these treatments, including injection site pain and orthostasis, were explained. As per mutual decision making, we will proceed with PTH analog therapy. A prescription for daily parathyroid hormone (eg tymlos) injections will be sent to specialty pharmacy, pending prior authorization. Her labs will be monitored within a month of starting the treatment to ensure her calcium levels remain stable. Will plan 12-month treatment course followed by transition to anti-resorptive (eg Reclast) for an additional 3-5 years of treatment with annual assessment for fracture risk, including monitoring of DXA.     Follow-up: A follow-up visit is scheduled in 6 months.    Orders:  •  Comprehensive metabolic panel; Future  •  C-Telopeptide; Future  •  Abaloparatide 3120 MCG/1.56ML SOPN; Inject 80 mcg under the skin daily

## 2025-07-01 NOTE — TELEPHONE ENCOUNTER
Patient called stating the Abaloparatide 3120 mcg injection will be $1900, she can not afford this. I asked if a prior auth would lower the cost, she does not know.   Please advise

## 2025-07-01 NOTE — PATIENT INSTRUCTIONS
Osteoporosis Education   Osteoporosis  is a long-term medical condition that causes your bones to become weak, brittle, and more likely to fracture. Osteoporosis occurs when your body absorbs more bone than it makes. It is also caused by a lack of calcium and estrogen (female hormone).  Common symptoms include the following:  You may not have any signs or symptoms. You may break a bone after a muscle strain, bump, or fall. A break usually occurs in the hip, spine, or wrist. A collapsed vertebra (bone in your spine) may cause severe back pain or loss of height from bent posture.  Call your doctor if:    You have severe pain.   You have increasing pain after a fall.   You have pain when you do your daily activities.   You have questions or concerns about your condition or care.  Diagnosis of osteoporosis:   Blood and urine tests  measure your calcium, vitamin D, and estrogen levels.    An x-ray or CT may show thinned bones or a fracture. You may be given contrast liquid to help the bones show up better in the pictures. Tell the healthcare provider if you have ever had an allergic reaction to contrast liquid. Do not enter the MRI room with anything metal. Metal can cause serious injury. Tell the healthcare provider if you have any metal in or on your body.    A bone density test  compares your bone thickness with what is expected for someone of your age, gender, and ethnicity.  Treatment for osteoporosis may include medicines to prevent bone loss, build new bone, and increase estrogen. These medicines help prevent fractures and may be given as a pill or injection. Ask your healthcare provider for more information on these medicines.  Prevent bone loss:  Eat healthy foods that are high in calcium.  This helps keep your bones strong. Good sources of calcium are milk, cheese, broccoli, tofu, almonds, and canned salmon and sardines. Recommended to get at least 1200mg daily of calcium.  Increase your vitamin D intake.   Vitamin D is in fish oils, some vegetables, and fortified milk, cereal, and bread. Vitamin D is also formed in the skin when it is exposed to the sun. Ask your healthcare provider how much sunlight is safe for you. You will require at least 800 units of vitamin D daily taken as a supplement.  Drink liquids as directed.  Ask your healthcare provider how much liquid to drink each day and which liquids are best for you. Do not have alcohol or caffeine. They decrease bone mineral density, which can weaken your bones.  Exercise regularly.  Ask your healthcare provider about the best exercise plan for you. Weight bearing exercise for 30 minutes, 3 times a week can help build and strengthen bone.  Do not smoke.  Nicotine and other chemicals in cigarettes and cigars can cause lung damage. Ask your healthcare provider for information if you currently smoke and need help to quit. E-cigarettes or smokeless tobacco still contain nicotine. Talk to your healthcare provider before you use these products.  Go to physical therapy as directed.  A physical therapist teaches you exercises to help improve movement and muscle strength.  Alcohol. It is recommended to avoid heavy alcohol use as increased consumption of alcohol is known to cause bone loss  © Copyright [employer] 2021 Information is for End User's use only and may not be sold, redistributed or otherwise used for commercial purposes. All illustrations and images included in CareNotes® are the copyrighted property of [employer] or [employer]

## 2025-07-02 ENCOUNTER — TELEPHONE (OUTPATIENT)
Dept: ENDOCRINOLOGY | Facility: CLINIC | Age: 60
End: 2025-07-02

## 2025-07-03 NOTE — TELEPHONE ENCOUNTER
PA for Abaloparatide 3120 MCG SUBMITTED to [employer]    via    [x]Other website [phone]  [x]PA sent as URGENT    All office notes, labs and other pertaining documents and studies sent. Clinical questions answered. Awaiting determination from insurance company.     Turnaround time for your insurance to make a decision on your Prior Authorization can take 7-21 business days.

## 2025-07-03 NOTE — TELEPHONE ENCOUNTER
PA for Abaloparatide 3120 MCG  APPROVED     Date(s) approved 7/3/25-7/3/27    Case #    Patient advised by          []High Fidelityhart Message  []Phone call   [x]LMOM  []L/M to call office as no active Communication consent on file  []Unable to leave detailed message as VM not approved on Communication consent       Pharmacy advised by    [x]Fax  []Phone call  []Secure Chat    Specialty Pharmacy    []      Approval letter scanned into Media Yes

## 2025-07-14 ENCOUNTER — TELEPHONE (OUTPATIENT)
Dept: ENDOCRINOLOGY | Facility: CLINIC | Age: 60
End: 2025-07-14

## 2025-07-14 DIAGNOSIS — M81.0 AGE-RELATED OSTEOPOROSIS WITHOUT CURRENT PATHOLOGICAL FRACTURE: Primary | ICD-10-CM

## 2025-07-18 RX ORDER — ALENDRONATE SODIUM 70 MG/1
70 TABLET ORAL
Qty: 12 TABLET | Refills: 1 | Status: SHIPPED | OUTPATIENT
Start: 2025-07-18

## 2025-07-18 NOTE — TELEPHONE ENCOUNTER
Looks like its in and was approved but you need to send to [facility] specialty pharmacy  
Patient called in stating that she does not want to take the injection. That she has been receiving injections for years for MS and does not want to be on an injectable for Osteoperosis. Please call patient and advise.  
Patient is NPO after midnight, diet order does not specify except meds, should I hold medications?
Rx sent  
Rx sent to [facility] specialty pharmacy  
Spoke to patient she would like script for fosamax sent to [facility]  
upon initial assessment pt surgical dressing appears to be saturated with serosanguineous fluid. primary team trauma [phone] dr garcia made aware.